# Patient Record
Sex: FEMALE | Race: BLACK OR AFRICAN AMERICAN | Employment: FULL TIME | URBAN - METROPOLITAN AREA
[De-identification: names, ages, dates, MRNs, and addresses within clinical notes are randomized per-mention and may not be internally consistent; named-entity substitution may affect disease eponyms.]

---

## 2017-01-13 ENCOUNTER — HOSPITAL ENCOUNTER (EMERGENCY)
Age: 27
Discharge: HOME | End: 2017-01-13
Attending: EMERGENCY MEDICINE | Admitting: EMERGENCY MEDICINE
Payer: COMMERCIAL

## 2017-01-13 ENCOUNTER — APPOINTMENT (OUTPATIENT)
Dept: GENERAL RADIOLOGY | Age: 27
End: 2017-01-13
Attending: PHYSICIAN ASSISTANT
Payer: COMMERCIAL

## 2017-01-13 VITALS
OXYGEN SATURATION: 100 % | TEMPERATURE: 97.9 F | HEIGHT: 65 IN | DIASTOLIC BLOOD PRESSURE: 77 MMHG | HEART RATE: 89 BPM | WEIGHT: 190 LBS | SYSTOLIC BLOOD PRESSURE: 116 MMHG | BODY MASS INDEX: 31.65 KG/M2 | RESPIRATION RATE: 20 BRPM

## 2017-01-13 DIAGNOSIS — S30.0XXA CONTUSION OF COCCYX, INITIAL ENCOUNTER: Primary | ICD-10-CM

## 2017-01-13 LAB
APPEARANCE UR: CLEAR
BILIRUB UR QL: NEGATIVE
COLOR UR: YELLOW
GLUCOSE UR STRIP.AUTO-MCNC: NEGATIVE MG/DL
HCG UR QL: NEGATIVE
HGB UR QL STRIP: NEGATIVE
KETONES UR QL STRIP.AUTO: NEGATIVE MG/DL
LEUKOCYTE ESTERASE UR QL STRIP.AUTO: NEGATIVE
NITRITE UR QL STRIP.AUTO: NEGATIVE
PH UR STRIP: 6 [PH] (ref 5–8)
PROT UR STRIP-MCNC: NEGATIVE MG/DL
SP GR UR REFRACTOMETRY: <1.005 (ref 1–1.03)
UROBILINOGEN UR QL STRIP.AUTO: 0.2 EU/DL (ref 0.2–1)

## 2017-01-13 PROCEDURE — 99283 EMERGENCY DEPT VISIT LOW MDM: CPT

## 2017-01-13 PROCEDURE — 72220 X-RAY EXAM SACRUM TAILBONE: CPT

## 2017-01-13 PROCEDURE — 81025 URINE PREGNANCY TEST: CPT | Performed by: PHYSICIAN ASSISTANT

## 2017-01-13 PROCEDURE — 81003 URINALYSIS AUTO W/O SCOPE: CPT | Performed by: PHYSICIAN ASSISTANT

## 2017-01-13 RX ORDER — HYDROCODONE BITARTRATE AND ACETAMINOPHEN 5; 325 MG/1; MG/1
1 TABLET ORAL
Qty: 20 TAB | Refills: 0 | Status: SHIPPED | OUTPATIENT
Start: 2017-01-13 | End: 2019-11-07 | Stop reason: CLARIF

## 2017-01-13 RX ORDER — NAPROXEN 500 MG/1
500 TABLET ORAL 2 TIMES DAILY WITH MEALS
Qty: 20 TAB | Refills: 0 | Status: SHIPPED | OUTPATIENT
Start: 2017-01-13 | End: 2017-01-23

## 2017-01-13 NOTE — ED PROVIDER NOTES
Patient is a 31 y.o. female presenting with fall and coccyx pain. The history is provided by the patient. Fall     Tailbone Pain      Елена Diaz is a 31 y.o. female presents with c/o pain in tailbone after a fall two days ago. Denies radicular sx, changes in bowel or urination. Denies previous history of back injury. Past Medical History:   Diagnosis Date    Anemia     Astigmatism        Past Surgical History:   Procedure Laterality Date    Hx gyn       csection         No family history on file. Social History     Social History    Marital status: SINGLE     Spouse name: N/A    Number of children: N/A    Years of education: N/A     Occupational History    Not on file. Social History Main Topics    Smoking status: Current Every Day Smoker     Packs/day: 0.25    Smokeless tobacco: Not on file    Alcohol use No    Drug use: No    Sexual activity: Yes     Partners: Male     Birth control/ protection: Condom     Other Topics Concern    Not on file     Social History Narrative         ALLERGIES: Review of patient's allergies indicates no known allergies. Review of Systems  Constitutional:  Denies malaise, fever, chills. Head:  Denies injury. Face:  Denies injury or pain. ENMT:  Denies sore throat. Neck:  Denies injury or pain. Chest:  Denies injury. Cardiac:  Denies chest pain or palpitations. Respiratory:  Denies cough, wheezing, difficulty breathing, shortness of breath. GI/ABD:  Denies injury, pain, distention, nausea, vomiting, diarrhea. :  Denies injury, pain, dysuria or urgency. Back:  Injury   Pelvis:  Denies injury or pain. Extremity/MS:  Denies injury or pain. Neuro:  Denies headache, LOC, dizziness, neurologic symptoms/deficits/paresthesias. Skin: Denies injury, rash, itching or skin changes.     Vitals:    01/13/17 1452   BP: 116/77   Pulse: 89   Resp: 20   Temp: 97.9 °F (36.6 °C)   SpO2: 100%   Weight: 86.2 kg (190 lb)   Height: 5' 5\" (1.651 m) Physical Exam   Nursing note and vitals reviewed. CONSTITUTIONAL: Alert, in no apparent distress; well-developed; well-nourished. HEAD:  Normocephalic, atraumatic. RESP: Chest clear, equal breath sounds. CV: S1 and S2 WNL; No murmurs, gallops or rubs. GI: Abdomen soft and non-tender. No masses or organomegaly. BACK:FROM, 5/5 strength, 2+DTR's,mild tenderness over coccyx, no erythema, no edema, no ecchymosis,(-) deformity, swelling, skin changes, midline tenderness or crepitus. (-) step offs. Neg SLR bilaterlly. Full sensation to deep and light palpation bilaterally. (-) foot drop  UPPER EXT:  Normal inspection. LOWER EXT: Normal inspection  NEURO:  strength 5/5 and sym, sensation intact. SKIN: No rashes; Normal for age and stage. PSYCH:  Alert and oriented, normal affect. MDM  Number of Diagnoses or Management Options  Contusion of coccyx, initial encounter:   Diagnosis management comments: DDx: sciatica, spinal stenosis, arthritis, DJD, spondylitis, muscular pain/spasm, Fx (traumatic, compression, etc.), cauda equina, epidural abscess, UTI, pyelo, STD,  Dlhi-Ytqe-Hlicxr syndrome, kidney stones, preg, ectopic, ovarian cyst, ovarian torsion, GI etiology (constipation, pancreatitis, hepatitis, gastritis, diverticulitis, colitis, gastric cancer, etc), acute abdomen (appendicitis, SBO, etc.), AAA, malignancy  IMPRESSION AND MEDICAL DECISION MAKING:  Based upon the patient's presentation with noted HPI and PE, along with the work up done in the emergency department, I believe that the patient has a contusion to her coccyx region. Will treat and have her follow up with her PCP as needed. The patient will be discharged home. Warning signs of worsening condition were discussed and understood by the patient. Based on patient's age, coexisting illness, exam, and the results of this ED evaluation, the decision to treat as an outpatient was made.  Based on the information available at time of discharge, acute pathology requiring immediate intervention was deemed relative unlikely. While it is impossible to completely exclude the possibility of underlying serious disease or worsening of condition, I feel the relative likelihood is extremely low. I discussed this uncertainty with the patient, who understood ED evaluation and treatment and felt comfortable with the outpatient treatment plan. All questions regarding care, test results, and follow up were answered. The patient is stable and appropriate to discharge. They understand that they should return to the emergency department for any new or worsening symptoms. I stressed the importance of follow up for repeat assessment and possibly further evaluation/treatment. Amount and/or Complexity of Data Reviewed  Clinical lab tests: ordered and reviewed  Tests in the radiology section of CPT®: ordered and reviewed  Independent visualization of images, tracings, or specimens: yes (No acute fracture observed)      ED Course       Procedures    Vitals:  Patient Vitals for the past 12 hrs:   Temp Pulse Resp BP SpO2   01/13/17 1452 97.9 °F (36.6 °C) 89 20 116/77 100 %         Medications ordered:   Medications - No data to display      Lab findings:  No results found for this or any previous visit (from the past 12 hour(s)). EKG interpretation by ED Physician:      X-Ray, CT or other radiology findings or impressions:  XR SACRUM AND COCCYX    (Results Pending)       Progress notes, Consult notes or additional Procedure notes:       Disposition:  Diagnosis: No diagnosis found. Disposition:   3:01 PM Pt reevaluated at this time and is resting comfortably in NAD. Discussed results and findings, as well as, diagnosis and plan for discharge. Pt verbalizes understanding and agreement with plan. All questions addressed at this time.      Follow-up Information     None           Patient's Medications    No medications on file

## 2017-01-13 NOTE — DISCHARGE INSTRUCTIONS
Low Back Contusion: Care Instructions  Your Care Instructions  Contusion is the medical term for a bruise. When you have a low back bruise, it's often caused by a direct blow or an impact, such as falling against a counter or table. Bruises are common sports injuries. Most people think of a bruise as a black-and-blue spot. This happens when small blood vessels get torn and leak blood under the skin. But bones, muscles, and organs can also get bruised. If these deep tissues are damaged, you may not always see a bruise. The doctor will examine your bruise. You may also have tests to make sure you do not have a more serious injury, such as a broken bone or nerve damage. Tests may include X-rays or other imaging tests like a CT scan or MRI. Low back bruises may cause pain and swelling. But if there is no serious damage, they will often get better with home treatment in several days to a few weeks. The doctor has checked you carefully, but problems can develop later. If you notice any problems or new symptoms, get medical treatment right away. Follow-up care is a key part of your treatment and safety. Be sure to make and go to all appointments, and call your doctor if you are having problems. It's also a good idea to know your test results and keep a list of the medicines you take. How can you care for yourself at home? · Put ice or a cold pack on the sore area for 10 to 20 minutes at a time to stop swelling. Put a thin cloth between the ice pack and your skin. · Be safe with medicines. Read and follow all instructions on the label. ¨ If the doctor gave you a prescription medicine for pain, take it as prescribed. ¨ If you are not taking a prescription pain medicine, ask your doctor if you can take an over-the-counter medicine. · For the first day or two of pain, take it easy. But as soon as possible, get back to your normal daily life and activities. · Get gentle exercise, such as walking.  Movement keeps your spine flexible and helps your muscles stay strong. When should you call for help? Call 911 anytime you think you may need emergency care. For example, call if:  · You are unable to move a leg at all. Call your doctor now or seek immediate medical care if:  · You have new or worse symptoms in your legs or buttocks. Symptoms may include:  ¨ Numbness or tingling. ¨ Weakness. ¨ Pain. · You lose bladder or bowel control. · You have blood in your urine. Watch closely for changes in your health, and be sure to contact your doctor if:  · You do not get better as expected. Where can you learn more? Go to http://emily-julius.info/. Enter V337 in the search box to learn more about \"Low Back Contusion: Care Instructions. \"  Current as of: May 27, 2016  Content Version: 11.1  © 3223-9305 [employer]. Care instructions adapted under license by [employer] (which disclaims liability or warranty for this information). If you have questions about a medical condition or this instruction, always ask your healthcare professional. [address] any warranty or liability for your use of this information.

## 2017-12-09 ENCOUNTER — HOSPITAL ENCOUNTER (EMERGENCY)
Age: 27
Discharge: HOME | End: 2017-12-09
Attending: EMERGENCY MEDICINE
Payer: COMMERCIAL

## 2017-12-09 VITALS
TEMPERATURE: 98.5 F | OXYGEN SATURATION: 100 % | SYSTOLIC BLOOD PRESSURE: 110 MMHG | DIASTOLIC BLOOD PRESSURE: 70 MMHG | RESPIRATION RATE: 15 BRPM | HEART RATE: 73 BPM

## 2017-12-09 DIAGNOSIS — H81.90 ACUTE VESTIBULAR SYNDROME: Primary | ICD-10-CM

## 2017-12-09 DIAGNOSIS — E86.0 DEHYDRATION: ICD-10-CM

## 2017-12-09 LAB
ALBUMIN SERPL-MCNC: 3.7 G/DL (ref 3.4–5)
ALBUMIN/GLOB SERPL: 0.9 {RATIO} (ref 0.8–1.7)
ALP SERPL-CCNC: 103 U/L (ref 45–117)
ALT SERPL-CCNC: 20 U/L (ref 13–56)
ANION GAP SERPL CALC-SCNC: 7 MMOL/L (ref 3–18)
APPEARANCE UR: CLEAR
AST SERPL-CCNC: 10 U/L (ref 15–37)
BASOPHILS # BLD: 0 K/UL (ref 0–0.1)
BASOPHILS NFR BLD: 0 % (ref 0–2)
BILIRUB SERPL-MCNC: 0.4 MG/DL (ref 0.2–1)
BILIRUB UR QL: NEGATIVE
BUN SERPL-MCNC: 6 MG/DL (ref 7–18)
BUN/CREAT SERPL: 9 (ref 12–20)
CALCIUM SERPL-MCNC: 8.6 MG/DL (ref 8.5–10.1)
CHLORIDE SERPL-SCNC: 105 MMOL/L (ref 100–108)
CO2 SERPL-SCNC: 26 MMOL/L (ref 21–32)
COLOR UR: YELLOW
CREAT SERPL-MCNC: 0.65 MG/DL (ref 0.6–1.3)
DIFFERENTIAL METHOD BLD: ABNORMAL
EOSINOPHIL # BLD: 0.3 K/UL (ref 0–0.4)
EOSINOPHIL NFR BLD: 2 % (ref 0–5)
ERYTHROCYTE [DISTWIDTH] IN BLOOD BY AUTOMATED COUNT: 15.1 % (ref 11.6–14.5)
GLOBULIN SER CALC-MCNC: 3.9 G/DL (ref 2–4)
GLUCOSE SERPL-MCNC: 78 MG/DL (ref 74–99)
GLUCOSE UR STRIP.AUTO-MCNC: NEGATIVE MG/DL
HCG UR QL: NEGATIVE
HCT VFR BLD AUTO: 37.5 % (ref 35–45)
HGB BLD-MCNC: 12.3 G/DL (ref 12–16)
HGB UR QL STRIP: NEGATIVE
KETONES UR QL STRIP.AUTO: ABNORMAL MG/DL
LEUKOCYTE ESTERASE UR QL STRIP.AUTO: NEGATIVE
LYMPHOCYTES # BLD: 3 K/UL (ref 0.9–3.6)
LYMPHOCYTES NFR BLD: 27 % (ref 21–52)
MCH RBC QN AUTO: 27.1 PG (ref 24–34)
MCHC RBC AUTO-ENTMCNC: 32.8 G/DL (ref 31–37)
MCV RBC AUTO: 82.6 FL (ref 74–97)
MONOCYTES # BLD: 1.2 K/UL (ref 0.05–1.2)
MONOCYTES NFR BLD: 11 % (ref 3–10)
NEUTS SEG # BLD: 6.4 K/UL (ref 1.8–8)
NEUTS SEG NFR BLD: 60 % (ref 40–73)
NITRITE UR QL STRIP.AUTO: NEGATIVE
PH UR STRIP: 6.5 [PH] (ref 5–8)
PLATELET # BLD AUTO: 334 K/UL (ref 135–420)
PMV BLD AUTO: 9.1 FL (ref 9.2–11.8)
POTASSIUM SERPL-SCNC: 3.6 MMOL/L (ref 3.5–5.5)
PROT SERPL-MCNC: 7.6 G/DL (ref 6.4–8.2)
PROT UR STRIP-MCNC: NEGATIVE MG/DL
RBC # BLD AUTO: 4.54 M/UL (ref 4.2–5.3)
SODIUM SERPL-SCNC: 138 MMOL/L (ref 136–145)
SP GR UR REFRACTOMETRY: 1.03 (ref 1–1.03)
UROBILINOGEN UR QL STRIP.AUTO: 1 EU/DL (ref 0.2–1)
WBC # BLD AUTO: 10.9 K/UL (ref 4.6–13.2)

## 2017-12-09 PROCEDURE — 80053 COMPREHEN METABOLIC PANEL: CPT | Performed by: EMERGENCY MEDICINE

## 2017-12-09 PROCEDURE — 74011250636 HC RX REV CODE- 250/636: Performed by: EMERGENCY MEDICINE

## 2017-12-09 PROCEDURE — 85025 COMPLETE CBC W/AUTO DIFF WBC: CPT | Performed by: EMERGENCY MEDICINE

## 2017-12-09 PROCEDURE — 99285 EMERGENCY DEPT VISIT HI MDM: CPT

## 2017-12-09 PROCEDURE — 81025 URINE PREGNANCY TEST: CPT | Performed by: EMERGENCY MEDICINE

## 2017-12-09 PROCEDURE — 81003 URINALYSIS AUTO W/O SCOPE: CPT | Performed by: EMERGENCY MEDICINE

## 2017-12-09 PROCEDURE — 96361 HYDRATE IV INFUSION ADD-ON: CPT

## 2017-12-09 PROCEDURE — 96360 HYDRATION IV INFUSION INIT: CPT

## 2017-12-09 RX ORDER — MECLIZINE HCL 12.5 MG 12.5 MG/1
25 TABLET ORAL DAILY
Status: DISCONTINUED | OUTPATIENT
Start: 2017-12-10 | End: 2017-12-09

## 2017-12-09 RX ORDER — MECLIZINE HCL 12.5 MG 12.5 MG/1
25 TABLET ORAL
Status: COMPLETED | OUTPATIENT
Start: 2017-12-09 | End: 2017-12-09

## 2017-12-09 RX ORDER — MECLIZINE HYDROCHLORIDE 25 MG/1
25 TABLET ORAL
Qty: 15 TAB | Refills: 0 | Status: SHIPPED | OUTPATIENT
Start: 2017-12-09 | End: 2017-12-19

## 2017-12-09 RX ADMIN — SODIUM CHLORIDE 2000 ML: 900 INJECTION, SOLUTION INTRAVENOUS at 18:19

## 2017-12-09 RX ADMIN — MECLIZINE 25 MG: 12.5 TABLET ORAL at 18:20

## 2017-12-09 NOTE — ED TRIAGE NOTES
Patient arrived from home c/o headache and dizziness. Patient denies shortness of breath or chest pain. Patient denies nausea vomiting chills fever.  Patient allergies verified Patient denies pain

## 2017-12-09 NOTE — ED PROVIDER NOTES
EMERGENCY DEPARTMENT HISTORY AND PHYSICAL EXAM    5:49 PM      Date: 12/9/2017  Patient Name: Amy Chavez    History of Presenting Illness     No chief complaint on file. History Provided By: Patient    Additional History (Context): Amy Chavez is a 28 y.o. female with No significant past medical history who presents with moderate intermittent worsening dizziness (room spinning) for 1 day that got worse about a hour ago. She states it lasts for about 10 seconds but comes back when she closes her eyes or moves her head. She states the dizziness gave her a headache and she took two Tylenol with no improvement of headache. Pt denies fever, chills or chest pain, ETOH use, drug use. Pt admits to tobacco use. No other concerns or symptoms at this time. PCP: Mk Daily MD    Chief Complaint: dizziness  Duration: 1 Days  Timing:  Intermittent and Worsening  Location: n/a  Quality: room spinning   Severity: Moderate  Modifying Factors: closing eyes or head movement makes dizziness come back, took 2 Tylenol with no improvement of headache  Associated Symptoms: headache      Current Facility-Administered Medications   Medication Dose Route Frequency Provider Last Rate Last Dose    [START ON 12/10/2017] meclizine (ANTIVERT) tablet 25 mg  25 mg Oral DAILY Evangelina Saldana MD        sodium chloride 0.9 % bolus infusion 2,000 mL  2,000 mL IntraVENous ONCE Evangelina Saldana MD         Current Outpatient Prescriptions   Medication Sig Dispense Refill    HYDROcodone-acetaminophen (NORCO) 5-325 mg per tablet Take 1 Tab by mouth every four (4) hours as needed for Pain. Max Daily Amount: 6 Tabs. 20 Tab 0       Past History     Past Medical History:  Past Medical History:   Diagnosis Date    Anemia     Astigmatism        Past Surgical History:  Past Surgical History:   Procedure Laterality Date    HX GYN      csection       Family History:  No family history on file.     Social History:  Social History Substance Use Topics    Smoking status: Current Every Day Smoker     Packs/day: 0.25    Smokeless tobacco: Not on file    Alcohol use No       Allergies:  No Known Allergies      Review of Systems     Review of Systems   Constitutional: Negative for chills. Neurological: Positive for dizziness and light-headedness. Physical Exam     Visit Vitals    BP (!) 81/48    Pulse (!) 103    Temp 98.5 °F (36.9 °C)    Resp 17    SpO2 100%       Physical Exam   Constitutional: She is oriented to person, place, and time. She appears well-developed. HENT:   Head: Normocephalic and atraumatic. Eyes: EOM are normal. Pupils are equal, round, and reactive to light. Neck: Normal range of motion. Neck supple. Cardiovascular: Normal rate, regular rhythm and normal heart sounds. Exam reveals no friction rub. No murmur heard. Pulmonary/Chest: Effort normal and breath sounds normal. No respiratory distress. She has no wheezes. Abdominal: Soft. She exhibits no distension. There is no tenderness. There is no rebound and no guarding. Musculoskeletal: Normal range of motion. Neurological: She is alert and oriented to person, place, and time. Skin: Skin is warm and dry. Psychiatric: She has a normal mood and affect. Her behavior is normal. Thought content normal.         Diagnostic Study Results         Medical Decision Making     1. Spinning; sensation of spinnign lasts about 10 seconds and resovles without intervention. Worse with position. No secondary sx of cva. BP noted. Will hydrate and get hcg. Pt without infectious sx. Pt denies headache to me    6:29 PM bp 120 hr 70; still sx. Turned over to dr Nadiya Das pending dipso. Diagnosis     No diagnosis found.     _______________________________    Attestations:  Scribe [address] [place] [address] acting as a scribe for and in the presence of Ruddy Valdez MD      December 09, 2017 at 5:49 PM       Provider Attestation:      I personally performed the services described in the documentation, reviewed the documentation, as recorded by the scribe in my presence, and it accurately and completely records my words and actions.  December 09, 2017 at 5:49 PM - Sharron Xiong MD    _______________________________

## 2017-12-10 NOTE — ED NOTES
1900: I assumed care of this patient from Dr. Gene Hollis. Patient presented with dizziness nausea vomiting diarrhea and transiently low blood pressure responded to fluids. She had reportedly unremarkable examination responded to fluid and was signed out pending reevaluation for meclizine efficacy. She was reevaluated by myself at 2030 hrs.: She is feeling slightly better, she had normal vital signs, she has a nonfocal neurological examination, pupils are 3 mm round and reactive, and no photophobia no nystagmus on H test, TMs clear bilaterally, she does report ear pain on both sides, her abdomen is benign, her  strength is equal bilaterally with no past pointing, her gait was not ataxic and narrow based. Mostly has an acute vestibular syndrome, we'll discharge with meclizine, strict return instructions follow-up as an outpatient. Patient's presentation, history, physical exam and laboratory evaluations were reviewed. At this time patient was felt to be stable for outpatient management and follow with primary care/specialist.  Patient was instructed to return to the emergency department with any concerns. Disposition:    Discharged home      Portions of this chart were created with Dragon medical speech to text program.   Unrecognized errors may be present.

## 2019-03-19 ENCOUNTER — HOSPITAL ENCOUNTER (EMERGENCY)
Age: 29
Discharge: HOME | End: 2019-03-19
Attending: EMERGENCY MEDICINE
Payer: COMMERCIAL

## 2019-03-19 ENCOUNTER — APPOINTMENT (OUTPATIENT)
Dept: GENERAL RADIOLOGY | Age: 29
End: 2019-03-19
Attending: PHYSICIAN ASSISTANT
Payer: COMMERCIAL

## 2019-03-19 VITALS
TEMPERATURE: 99.8 F | SYSTOLIC BLOOD PRESSURE: 128 MMHG | OXYGEN SATURATION: 100 % | HEART RATE: 101 BPM | DIASTOLIC BLOOD PRESSURE: 78 MMHG | RESPIRATION RATE: 16 BRPM

## 2019-03-19 DIAGNOSIS — R50.9 FEVER, UNSPECIFIED FEVER CAUSE: ICD-10-CM

## 2019-03-19 DIAGNOSIS — R52 GENERALIZED BODY ACHES: ICD-10-CM

## 2019-03-19 DIAGNOSIS — J02.9 SORE THROAT: ICD-10-CM

## 2019-03-19 DIAGNOSIS — J10.1 INFLUENZA A: Primary | ICD-10-CM

## 2019-03-19 LAB
FLUAV AG NPH QL IA: POSITIVE
FLUBV AG NOSE QL IA: NEGATIVE

## 2019-03-19 PROCEDURE — 87804 INFLUENZA ASSAY W/OPTIC: CPT

## 2019-03-19 PROCEDURE — 99283 EMERGENCY DEPT VISIT LOW MDM: CPT

## 2019-03-19 PROCEDURE — 87081 CULTURE SCREEN ONLY: CPT

## 2019-03-19 PROCEDURE — 71046 X-RAY EXAM CHEST 2 VIEWS: CPT

## 2019-03-19 PROCEDURE — 74011250637 HC RX REV CODE- 250/637: Performed by: PHYSICIAN ASSISTANT

## 2019-03-19 RX ORDER — OSELTAMIVIR PHOSPHATE 75 MG/1
75 CAPSULE ORAL 2 TIMES DAILY
Qty: 10 CAP | Refills: 0 | Status: SHIPPED | OUTPATIENT
Start: 2019-03-19 | End: 2019-03-24

## 2019-03-19 RX ORDER — ACETAMINOPHEN 500 MG
1000 TABLET ORAL
Status: COMPLETED | OUTPATIENT
Start: 2019-03-19 | End: 2019-03-19

## 2019-03-19 RX ORDER — ACETAMINOPHEN 325 MG/1
650 TABLET ORAL
Qty: 20 TAB | Refills: 0 | Status: SHIPPED | OUTPATIENT
Start: 2019-03-19 | End: 2021-05-16

## 2019-03-19 RX ORDER — IBUPROFEN 600 MG/1
600 TABLET ORAL
Status: COMPLETED | OUTPATIENT
Start: 2019-03-19 | End: 2019-03-19

## 2019-03-19 RX ORDER — IBUPROFEN 800 MG/1
800 TABLET ORAL
Qty: 20 TAB | Refills: 0 | Status: SHIPPED | OUTPATIENT
Start: 2019-03-19 | End: 2019-03-26

## 2019-03-19 RX ADMIN — IBUPROFEN 600 MG: 600 TABLET ORAL at 14:42

## 2019-03-19 RX ADMIN — ACETAMINOPHEN 1000 MG: 500 TABLET ORAL at 14:42

## 2019-03-19 NOTE — ED TRIAGE NOTES
Patient arrived from home c/o flu-like symptoms since last night. Patient denies taking otc medications except nyquil for sleep. No allergies at this time

## 2019-03-19 NOTE — ED PROVIDER NOTES
EMERGENCY DEPARTMENT HISTORY AND PHYSICAL EXAM 
 
Date: 3/19/2019 Patient Name: Krishna Mason History of Presenting Illness Chief Complaint Patient presents with  Flu Like Symptoms History Provided By: Patient Chief Complaint: sore throat Duration: 1 days Timing:  Acute Location: throat Quality: redness Modifying Factors: Pt did not get a flu shot Associated Symptoms: bilateral ear pain, body aches,  HA, and fever. Additional History (Context): Krishna Mason is a 26 y.o. female with with a history of anemia and astigmatism who presents with sore throat, body aches, headache, cough, bilateral ear pain. That started last night. . Pt did not get the flu vaccine. No OTC meds for her sx. Denies SOB. Denies drooling changes in voice shortness of breath or inability to speak in full sentences. PCP: Celso Haas MD 
 
Current Outpatient Medications Medication Sig Dispense Refill  oseltamivir (TAMIFLU) 75 mg capsule Take 1 Cap by mouth two (2) times a day for 5 days. 10 Cap 0  ibuprofen (MOTRIN) 800 mg tablet Take 1 Tab by mouth every six (6) hours as needed for Pain for up to 7 days. 20 Tab 0  
 acetaminophen (TYLENOL) 325 mg tablet Take 2 Tabs by mouth every four (4) hours as needed for Pain. 20 Tab 0  
 HYDROcodone-acetaminophen (NORCO) 5-325 mg per tablet Take 1 Tab by mouth every four (4) hours as needed for Pain. Max Daily Amount: 6 Tabs. 20 Tab 0 Past History Past Medical History: 
Past Medical History:  
Diagnosis Date  Anemia  Astigmatism Past Surgical History: 
Past Surgical History:  
Procedure Laterality Date  HX GYN    
 csection Family History: No family history on file. Social History: 
Social History Tobacco Use  Smoking status: Current Every Day Smoker Packs/day: 0.25 Substance Use Topics  Alcohol use: No  
 Drug use: No  
 
 
Allergies: 
No Known Allergies Review of Systems Review of Systems Constitutional: Positive for fatigue (body aches) and fever. Negative for chills. HENT: Positive for sore throat. Negative for congestion and rhinorrhea. Eyes: Positive for pain. Negative for visual disturbance. Respiratory: Negative for cough and shortness of breath. Cardiovascular: Negative for chest pain and palpitations. Gastrointestinal: Negative for abdominal pain, nausea and vomiting. Musculoskeletal: Negative for back pain and myalgias. Skin: Negative. Allergic/Immunologic: Negative. Neurological: Positive for headaches. All other systems reviewed and are negative. All Other Systems Negative Physical Exam  
 
Vitals:  
 03/19/19 1429 03/19/19 1540 BP: 128/78 Pulse: (!) 113 (!) 101 Resp: 16 Temp: (!) 101 °F (38.3 °C) 99.8 °F (37.7 °C) SpO2: 100% Physical Exam  
Constitutional: She is oriented to person, place, and time. She appears well-developed and well-nourished. No distress. HENT:  
Head: Normocephalic and atraumatic. Right Ear: Tympanic membrane and ear canal normal.  
Left Ear: Tympanic membrane and ear canal normal.  
Nose: Nose normal. Right sinus exhibits no maxillary sinus tenderness and no frontal sinus tenderness. Left sinus exhibits no maxillary sinus tenderness and no frontal sinus tenderness. Mouth/Throat: Uvula is midline, oropharynx is clear and moist and mucous membranes are normal. No trismus in the jaw. No uvula swelling. Tonsils are equal and bilateral.  No drooling. Eyes: Conjunctivae are normal.  
Neck: Normal range of motion. Neck supple. Cardiovascular: Normal rate, regular rhythm and normal heart sounds. Pulmonary/Chest: Effort normal and breath sounds normal. No respiratory distress. She exhibits no tenderness. Abdominal: Soft. Bowel sounds are normal. She exhibits no distension. There is no tenderness. There is no rebound and no guarding. Musculoskeletal: She exhibits no edema or deformity. Neurological: She is alert and oriented to person, place, and time. She has normal reflexes. Skin: Skin is warm and dry. She is not diaphoretic. Psychiatric: She has a normal mood and affect. Nursing note and vitals reviewed. Diagnostic Study Results Labs - Recent Results (from the past 12 hour(s)) STREP THROAT SCREEN Collection Time: 03/19/19  2:30 PM  
Result Value Ref Range Special Requests: NO SPECIAL REQUESTS Strep Screen NEGATIVE Culture result: PENDING   
INFLUENZA A & B AG (RAPID TEST) Collection Time: 03/19/19  2:30 PM  
Result Value Ref Range Influenza A Antigen POSITIVE (A) NEG Influenza B Antigen NEGATIVE  NEG Radiologic Studies -  
XR CHEST PA LAT    (Results Pending) CT Results  (Last 48 hours) None CXR Results  (Last 48 hours) None Medical Decision Making I am the first provider for this patient. I reviewed the vital signs, available nursing notes, past medical history, past surgical history, family history and social history. Vital Signs-Reviewed the patient's vital signs. Pulse Oximetry Analysis -  100 % RA Records Reviewed: Nursing Notes and Old Medical Records Procedures: None Procedures Provider Notes (Medical Decision Making):  
 
Differential Diagnosis:  influenza, mononucleosis, acute bronchitis, URI, streptococcal pharyngitis, pertussis, pneumonia, asthma exacerbation, allergic rhinitis Plan: 
 
2:36 PM 
Do not feel patient's current SIRS criteria is attributed to sepsis at this time will order throat screen, flu swab, chest x-ray, Tylenol and Motrin and continue to reevaluate and reassess. Discussed with patient limitations of flu swab. 3:45 PM 
Fever has normalized. Have shared positive flu with patient. Have stressed the importance of alternating Tylenol and Motrin at home. Have shared reassuring throat screen and chest x-ray.   Have stressed the importance of hydration and rest at home. We will send home with work note. Return precautions given as well as PCP follow-up. Patient agrees with the plan and management and states all questions have been thoroughly answered and there are no more remaining questions. MED RECONCILIATION: 
No current facility-administered medications for this encounter. Current Outpatient Medications Medication Sig  oseltamivir (TAMIFLU) 75 mg capsule Take 1 Cap by mouth two (2) times a day for 5 days.  ibuprofen (MOTRIN) 800 mg tablet Take 1 Tab by mouth every six (6) hours as needed for Pain for up to 7 days.  acetaminophen (TYLENOL) 325 mg tablet Take 2 Tabs by mouth every four (4) hours as needed for Pain.  HYDROcodone-acetaminophen (NORCO) 5-325 mg per tablet Take 1 Tab by mouth every four (4) hours as needed for Pain. Max Daily Amount: 6 Tabs. Disposition: 
Home DISCHARGE NOTE:  
Pt has been reexamined. Patient has no new complaints, changes, or physical findings. Care plan outlined and precautions discussed. Results of workup were reviewed with the patient. All medications were reviewed with the patient. All of pt's questions and concerns were addressed. Patient was instructed and agrees to follow up with PCP, as well as to return to the ED upon further deterioration. Patient is ready to go home. Follow-up Information Follow up With Specialties Details Why Contact Info [facility] EMERGENCY DEPT Emergency Medicine  As needed [address] [id number] 
[phone] Celso Haas MD Obstetrics & Gynecology, Gynecology, Obstetrics In 2 days  [address] [place] [id number] 
[phone] Current Discharge Medication List  
  
START taking these medications Details  
oseltamivir (TAMIFLU) 75 mg capsule Take 1 Cap by mouth two (2) times a day for 5 days.  
Qty: 10 Cap, Refills: 0  
  
ibuprofen (MOTRIN) 800 mg tablet Take 1 Tab by mouth every six (6) hours as needed for Pain for up to 7 days. Qty: 20 Tab, Refills: 0  
  
acetaminophen (TYLENOL) 325 mg tablet Take 2 Tabs by mouth every four (4) hours as needed for Pain. Qty: 20 Tab, Refills: 0 Diagnosis Clinical Impression:  
1. Influenza A 2. Fever, unspecified fever cause 3. Generalized body aches 4. Sore throat Scribe Attestation Ronbrendan Valdestasha acting as a scribe for and in the presence of Raven Stack March 19, 2019 at 2:28 PM 
    
Provider Attestation:     
I personally performed the services described in the documentation, reviewed the documentation, as recorded by the scribe in my presence, and it accurately and completely records my words and actions.  March 19, 2019 at 2:28 PM - Raven Stack

## 2019-03-19 NOTE — LETTER
[facility] EMERGENCY DEPT 
[address] [facility] [id number] 
[phone] Work/School Note Date: 3/19/2019 To Whom It May concern: 
 
Adina Carey was seen and treated today in the emergency room by the following provider(s): 
Attending Provider: Iver Bence, MD 
Physician Assistant: Pavithra Gonzalez. Adina Carey may return to work on March 25, 2019. Sincerely, Pavithra Robison

## 2019-03-19 NOTE — DISCHARGE INSTRUCTIONS
Patient Education        Learning About Fever  What is a fever? A fever is a high body temperature. It's one way your body fights being sick. A fever shows that the body is responding to infection or other illnesses, both minor and severe. A fever is a symptom, not an illness by itself. A fever can be a sign that you are ill, but most fevers are not caused by a serious problem. You may have a fever with a minor illness, such as a cold. But sometimes a very serious infection may cause little or no fever. It is important to look at other symptoms, other conditions you have, and how you feel in general. In children, notice how they act and see what symptoms they complain of. What is a normal body temperature? A normal body temperature is about 98. 6ºF. Some people have a normal temperature that is a little higher or a little lower than this. Your temperature may be a little lower in the morning than it is later in the day. It may go up during hot weather or when you exercise, wear heavy clothes, or take a hot bath. Your temperature may also be different depending on how you take it. A temperature taken in the mouth (oral) or under the arm may be a little lower than your core temperature (rectal). What is a fever temperature? A core temperature of 100.4°F or above is considered a fever. What can cause a fever? A fever may be caused by:  · Infections. This is the most common cause of a fever. Examples of infections that can cause a fever include the flu, a kidney infection, or pneumonia. · Some medicines. · Severe trauma or injury, such as a heart attack, stroke, heatstroke, or burns. · Other medical conditions, such as arthritis and some cancers. How can you treat a fever at home? · Ask your doctor if you can take an over-the-counter pain medicine, such as acetaminophen (Tylenol), ibuprofen (Advil, Motrin), or naproxen (Aleve). Be safe with medicines. Read and follow all instructions on the label.   · To prevent dehydration, drink plenty of fluids. Choose water and other caffeine-free clear liquids until you feel better. If you have kidney, heart, or liver disease and have to limit fluids, talk with your doctor before you increase the amount of fluids you drink. Follow-up care is a key part of your treatment and safety. Be sure to make and go to all appointments, and call your doctor if you are having problems. It's also a good idea to know your test results and keep a list of the medicines you take. Where can you learn more? Go to http://emily-julius.info/. Enter T014 in the search box to learn more about \"Learning About Fever. \"  Current as of: September 23, 2018  Content Version: 11.9  © 1138-8696 [employer]. Care instructions adapted under license by [employer] (which disclaims liability or warranty for this information). If you have questions about a medical condition or this instruction, always ask your healthcare professional. [place] [address] any warranty or liability for your use of this information. Patient Education        Influenza (Flu): Care Instructions  Your Care Instructions    Influenza (flu) is an infection in the lungs and breathing passages. It is caused by the influenza virus. There are different strains, or types, of the flu virus from year to year. Unlike the common cold, the flu comes on suddenly and the symptoms, such as a cough, congestion, fever, chills, fatigue, aches, and pains, are more severe. These symptoms may last up to 10 days. Although the flu can make you feel very sick, it usually doesn't cause serious health problems. Home treatment is usually all you need for flu symptoms. But your doctor may prescribe antiviral medicine to prevent other health problems, such as pneumonia, from developing.  Older people and those who have a long-term health condition, such as lung disease, are most at risk for having pneumonia or other health problems. Follow-up care is a key part of your treatment and safety. Be sure to make and go to all appointments, and call your doctor if you are having problems. It's also a good idea to know your test results and keep a list of the medicines you take. How can you care for yourself at home? · Get plenty of rest.  · Drink plenty of fluids, enough so that your urine is light yellow or clear like water. If you have kidney, heart, or liver disease and have to limit fluids, talk with your doctor before you increase the amount of fluids you drink. · Take an over-the-counter pain medicine if needed, such as acetaminophen (Tylenol), ibuprofen (Advil, Motrin), or naproxen (Aleve), to relieve fever, headache, and muscle aches. Read and follow all instructions on the label. No one younger than 20 should take aspirin. It has been linked to Reye syndrome, a serious illness. · Do not smoke. Smoking can make the flu worse. If you need help quitting, talk to your doctor about stop-smoking programs and medicines. These can increase your chances of quitting for good. · Breathe moist air from a hot shower or from a sink filled with hot water to help clear a stuffy nose. · Before you use cough and cold medicines, check the label. These medicines may not be safe for young children or for people with certain health problems. · If the skin around your nose and lips becomes sore, put some petroleum jelly on the area. · To ease coughing:  ? Drink fluids to soothe a scratchy throat. ? Suck on cough drops or plain hard candy. ? Take an over-the-counter cough medicine that contains dextromethorphan to help you get some sleep. Read and follow all instructions on the label. ? Raise your head at night with an extra pillow. This may help you rest if coughing keeps you awake. · Take any prescribed medicine exactly as directed. Call your doctor if you think you are having a problem with your medicine.   To avoid spreading the flu  · Wash your hands regularly, and keep your hands away from your face. · Stay home from school, work, and other public places until you are feeling better and your fever has been gone for at least 24 hours. The fever needs to have gone away on its own without the help of medicine. · Ask people living with you to talk to their doctors about preventing the flu. They may get antiviral medicine to keep from getting the flu from you. · To prevent the flu in the future, get a flu vaccine every fall. Encourage people living with you to get the vaccine. · Cover your mouth when you cough or sneeze. When should you call for help? Call 911 anytime you think you may need emergency care. For example, call if:    · You have severe trouble breathing.    Call your doctor now or seek immediate medical care if:    · You have new or worse trouble breathing.     · You seem to be getting much sicker.     · You feel very sleepy or confused.     · You have a new or higher fever.     · You get a new rash.    Watch closely for changes in your health, and be sure to contact your doctor if:    · You begin to get better and then get worse.     · You are not getting better after 1 week. Where can you learn more? Go to http://emily-julius.info/. Enter O020 in the search box to learn more about \"Influenza (Flu): Care Instructions. \"  Current as of: September 5, 2018  Content Version: 11.9  © 6847-5684 [employer]. Care instructions adapted under license by [employer] (which disclaims liability or warranty for this information). If you have questions about a medical condition or this instruction, always ask your healthcare professional. [address] any warranty or liability for your use of this information.

## 2019-03-21 LAB
B-HEM STREP THROAT QL CULT: NEGATIVE
BACTERIA SPEC CULT: NORMAL
SERVICE CMNT-IMP: NORMAL

## 2019-11-04 ENCOUNTER — APPOINTMENT (OUTPATIENT)
Dept: GENERAL RADIOLOGY | Age: 29
End: 2019-11-04
Attending: EMERGENCY MEDICINE
Payer: COMMERCIAL

## 2019-11-04 ENCOUNTER — HOSPITAL ENCOUNTER (EMERGENCY)
Age: 29
Discharge: HOME | End: 2019-11-04
Attending: EMERGENCY MEDICINE
Payer: COMMERCIAL

## 2019-11-04 VITALS
BODY MASS INDEX: 36.65 KG/M2 | HEART RATE: 91 BPM | OXYGEN SATURATION: 100 % | RESPIRATION RATE: 14 BRPM | HEIGHT: 65 IN | DIASTOLIC BLOOD PRESSURE: 79 MMHG | WEIGHT: 220 LBS | TEMPERATURE: 98.4 F | SYSTOLIC BLOOD PRESSURE: 130 MMHG

## 2019-11-04 DIAGNOSIS — S60.221A CONTUSION OF RIGHT HAND, INITIAL ENCOUNTER: Primary | ICD-10-CM

## 2019-11-04 PROCEDURE — 99281 EMR DPT VST MAYX REQ PHY/QHP: CPT

## 2019-11-04 PROCEDURE — 73130 X-RAY EXAM OF HAND: CPT

## 2019-11-04 RX ORDER — IBUPROFEN 800 MG/1
800 TABLET ORAL
Qty: 20 TAB | Refills: 0 | Status: SHIPPED | OUTPATIENT
Start: 2019-11-04 | End: 2019-11-07 | Stop reason: CLARIF

## 2019-11-04 NOTE — LETTER
NOTIFICATION OF RETURN TO WORK 
 
11/4/2019 5:28 PM 
 
Ms. Angel Mitchell [address] [place] [id number] Nina Ask To Whom It May Concern: 
 
Angel Mitchell was under the care of [facility]. She will be able to return to work on Wednesday, 11/06/19. If there are questions or concerns please have the patient contact our office. Sincerely, Maury Vasquez PA-C

## 2019-11-04 NOTE — ED TRIAGE NOTES
The patient presents for evaluation of right hand pain. States, \"I was moving a TV and it dropped on my hand.  \"

## 2019-11-04 NOTE — DISCHARGE INSTRUCTIONS
Patient Education     Please return immediately to the Emergency Room for re-evaluation if you are not improving, develop any new symptoms, or develop worsening of current symptoms! If you have been prescribed a medication and are unable to take this medication for any reason, please return to the Emergency Department for further evaluation! If you have been referred for follow-up to a specialist, but are unable to follow-up and your symptoms are either not improving or are worsening, please return to the Emergency Department for further evaluation! Hand Bruises: Care Instructions  Your Care Instructions  Bruises, or contusions, can happen as a result of an impact or fall. Most people think of a bruise as a black-and-blue spot. This happens when small blood vessels get torn and leak blood under the skin. The bruise may turn purplish black, reddish blue, or yellowish green as it heals. But bones and muscles can also get bruised. This may damage the hand but not cause a bruise that you can see. Most bruises aren't serious and will go away on their own in 2 to 4 weeks. But sometimes a more serious hand injury might not heal on its own. Tell your doctor if you have new symptoms or your injury is not getting better over time. You may have tests to see if you have bone or nerve damage. These tests may include X-rays, a CT scan, or an MRI. If you damaged bones or muscles, you may need more treatment. The doctor has checked you carefully, but problems can develop later. If you notice any problems or new symptoms, get medical treatment right away. Follow-up care is a key part of your treatment and safety. Be sure to make and go to all appointments, and call your doctor if you are having problems. It's also a good idea to know your test results and keep a list of the medicines you take. How can you care for yourself at home? · Put ice or a cold pack on the hand for 10 to 20 minutes at a time.  Put a thin cloth between the ice and your skin. · Prop up your hand on a pillow when you ice it or anytime you sit or lie down during the next 3 days. Try to keep your hand above the level of your heart. This will help reduce swelling. · Be safe with medicines. Read and follow all instructions on the label. ? If the doctor gave you a prescription medicine for pain, take it as prescribed. ? If you are not taking a prescription pain medicine, ask your doctor if you can take an over-the-counter medicine. · Be sure to follow your doctor's advice about moving and exercising your injured hand. When should you call for help? Call your doctor now or seek immediate medical care if:    · Your pain gets worse.     · You have new or worse swelling.     · You have tingling, weakness, or numbness in the area near the bruise.     · The area near the bruise is cold or pale.     · You have symptoms of infection, such as:  ? Increased pain, swelling, warmth, or redness. ? Red streaks leading from the area. ? Pus draining from the area. ? A fever.    Watch closely for changes in your health, and be sure to contact your doctor if:    · You do not get better as expected. Where can you learn more? Go to http://emily-julius.info/. Enter A743 in the search box to learn more about \"Hand Bruises: Care Instructions. \"  Current as of: June 26, 2019  Content Version: 12.2  © 3574-3482 [employer]. Care instructions adapted under license by [employer] (which disclaims liability or warranty for this information). If you have questions about a medical condition or this instruction, always ask your healthcare professional. [address] any warranty or liability for your use of this information.

## 2019-11-04 NOTE — ED PROVIDER NOTES
EMERGENCY DEPARTMENT HISTORY AND PHYSICAL EXAM    5:29 PM      Date: 11/4/2019  Patient Name: Rose Marie Seals    History of Presenting Illness     Chief Complaint   Patient presents with    Hand Pain       History Provided By: Patient    Chief Complaint: right hand pain, swelling  Duration: 1 Days  Timing:  Acute  Location:   Quality: Aching  Severity: Moderate  Modifying Factors: none  Associated Symptoms: denies any other associated signs or symptoms      Additional History (Context):Marcie Elaine is a 26 y.o. female with a pertinent history of anemia, astigmatism who presents to the emergency department for evaluation of right hand pain and swelling x1 day. Patient reports she was lifting a TV when it fell in her hand. She has been applying ice without relief in her symptoms. She is able to straighten her fingers out, but states she has been having increased difficulty making a fist.  No recent illnesses or other injuries. PCP:  Shirlene Epley, MD      Current Outpatient Medications   Medication Sig Dispense Refill    ibuprofen (MOTRIN) 800 mg tablet Take 1 Tab by mouth every six (6) hours as needed for Pain for up to 7 days. 20 Tab 0    acetaminophen (TYLENOL) 325 mg tablet Take 2 Tabs by mouth every four (4) hours as needed for Pain. 20 Tab 0    HYDROcodone-acetaminophen (NORCO) 5-325 mg per tablet Take 1 Tab by mouth every four (4) hours as needed for Pain. Max Daily Amount: 6 Tabs. 20 Tab 0       Past History     Past Medical History:  Past Medical History:   Diagnosis Date    Anemia     Astigmatism        Past Surgical History:  Past Surgical History:   Procedure Laterality Date    HX GYN      csection       Family History:  History reviewed. No pertinent family history. Social History:  Social History     Tobacco Use    Smoking status: Current Every Day Smoker     Packs/day: 0.25    Smokeless tobacco: Never Used   Substance Use Topics    Alcohol use: No    Drug use:  No Allergies:  No Known Allergies      Review of Systems       Review of Systems   Constitutional: Negative for chills and fever. HENT: Negative for congestion, rhinorrhea and sore throat. Respiratory: Negative for cough and shortness of breath. Cardiovascular: Negative for chest pain. Gastrointestinal: Negative for abdominal pain, blood in stool, constipation, diarrhea, nausea and vomiting. Genitourinary: Negative for dysuria, frequency and hematuria. Musculoskeletal: Positive for arthralgias and joint swelling. Negative for back pain and myalgias. Skin: Negative for rash and wound. Neurological: Negative for dizziness and headaches. All other systems reviewed and are negative. Physical Exam     Visit Vitals  /79 (BP 1 Location: Left arm, BP Patient Position: At rest)   Pulse 91   Temp 98.4 °F (36.9 °C)   Resp 14   Ht 5' 5\" (1.651 m)   Wt 99.8 kg (220 lb)   SpO2 100%   BMI 36.61 kg/m²       Physical Exam   Constitutional: She is oriented to person, place, and time. She appears well-developed and well-nourished. No distress. HENT:   Head: Normocephalic and atraumatic. Eyes: Conjunctivae are normal.   Neck: Normal range of motion. Neck supple. Cardiovascular: Normal rate, regular rhythm and normal heart sounds. Pulmonary/Chest: Effort normal and breath sounds normal. No respiratory distress. She exhibits no tenderness. Musculoskeletal: She exhibits edema and tenderness. She exhibits no deformity. Erythema and edema noted to dorsal aspect of right hand with tenderness to palpation. Intact extension. Decreased flexion secondary to swelling. Intact distal sensation and cap refill < 2 sec. Neurological: She is alert and oriented to person, place, and time. She has normal reflexes. Skin: Skin is warm and dry. She is not diaphoretic. Psychiatric: She has a normal mood and affect. Nursing note and vitals reviewed.       Diagnostic Study Results     Labs -  No results found for this or any previous visit (from the past 12 hour(s)). Radiologic Studies -   Xr Hand Rt Min 3 V    Result Date: 11/4/2019  EXAM:  XR HAND RT MIN 3 V INDICATION:  trauma COMPARISON: None. FINDINGS: 3  views of the right hand demonstrate no fracture or other osseous, articular or soft tissue foreign body. There is moderate soft tissue swelling on the dorsum of the hand. IMPRESSION: No fracture or dislocation. Moderate soft tissue swelling       Medical Decision Making   I am the first provider for this patient. I reviewed the vital signs, available nursing notes, past medical history, past surgical history, family history and social history. Vital Signs-Reviewed the patient's vital signs. Pulse Oximetry Analysis -  100% on room air (Interpretation)    Records Reviewed: Nursing Notes and Old Medical Records (Time of Review: 5:29 PM)    ED Course: Progress Notes, Reevaluation, and Consults:    Provider Notes (Medical Decision Making):   differential diagnosis: Fracture, dislocation, contusion, hematoma, sprain    Plan: Patient presents ambulatory no significant distress with normal vitals exam and HPI consistent with contusion. X-ray negative for acute fracture or dislocation. Placed in Ace wrap by myself. Will discharge home with Motrin. Patient has been advised to use ice. Follow-up with Ortho if no improvement in 1 week. At this time, patient is stable and appropriate for discharge home. Patient demonstrates understanding of current diagnoses and is in agreement with the treatment plan. They are advised that while the likelihood of serious underlying condition is low at this point given the evaluation performed today, we cannot fully rule it out. They are advised to immediately return with any new symptoms or worsening of current condition. All questions have been answered.   Patient is given educational material regarding their diagnoses, including danger symptoms and when to return to the ED. Diagnosis     Clinical Impression:   1. Contusion of right hand, initial encounter        Disposition:DC Home    Follow-up Information     Follow up With Specialties Details Why [address] [facility] Call in 2 days  [address], [address] [address]  [phone]    [facility] EMERGENCY DEPT Emergency Medicine Go to As needed, If symptoms worsen [address] [address]  [phone]           Patient's Medications   Start Taking    IBUPROFEN (MOTRIN) 800 MG TABLET    Take 1 Tab by mouth every six (6) hours as needed for Pain for up to 7 days. Continue Taking    ACETAMINOPHEN (TYLENOL) 325 MG TABLET    Take 2 Tabs by mouth every four (4) hours as needed for Pain. HYDROCODONE-ACETAMINOPHEN (NORCO) 5-325 MG PER TABLET    Take 1 Tab by mouth every four (4) hours as needed for Pain. Max Daily Amount: 6 Tabs. These Medications have changed    No medications on file   Stop Taking    No medications on file     _______________________________    This note was dictated utilizing voice recognition software which may lead to typographical errors. I apologize in advance if the situation occurs. If questions arise please do not hesitate to contact me or call our department.   Petty Hess PA-C

## 2019-11-07 ENCOUNTER — APPOINTMENT (OUTPATIENT)
Dept: GENERAL RADIOLOGY | Age: 29
End: 2019-11-07
Attending: PHYSICIAN ASSISTANT
Payer: COMMERCIAL

## 2019-11-07 ENCOUNTER — HOSPITAL ENCOUNTER (EMERGENCY)
Age: 29
Discharge: HOME | End: 2019-11-07
Attending: EMERGENCY MEDICINE
Payer: COMMERCIAL

## 2019-11-07 VITALS
HEART RATE: 86 BPM | TEMPERATURE: 98.3 F | DIASTOLIC BLOOD PRESSURE: 78 MMHG | OXYGEN SATURATION: 100 % | WEIGHT: 210 LBS | SYSTOLIC BLOOD PRESSURE: 125 MMHG | RESPIRATION RATE: 16 BRPM | HEIGHT: 65 IN | BODY MASS INDEX: 34.99 KG/M2

## 2019-11-07 DIAGNOSIS — S60.221A CONTUSION OF RIGHT HAND, INITIAL ENCOUNTER: Primary | ICD-10-CM

## 2019-11-07 PROCEDURE — 99283 EMERGENCY DEPT VISIT LOW MDM: CPT

## 2019-11-07 PROCEDURE — 73130 X-RAY EXAM OF HAND: CPT

## 2019-11-07 RX ORDER — NAPROXEN 500 MG/1
500 TABLET ORAL 2 TIMES DAILY WITH MEALS
Qty: 20 TAB | Refills: 0 | Status: SHIPPED | OUTPATIENT
Start: 2019-11-07 | End: 2019-11-17

## 2019-11-07 NOTE — ED PROVIDER NOTES
HPI     Braeden Berger is a 26 y.o. female dilatory to ED C/o right hand pain and swelling. Symptoms started Sunday. Pt dropped a dresser on her right hand, was seen in the ED Monday, and the symptoms have progressively gotten worse. Patient was able to continue working her job at [employer], [address] [place] [address]. RT-hand dominant    Past Medical History:   Diagnosis Date    Anemia     Astigmatism        Past Surgical History:   Procedure Laterality Date    HX GYN      csection         History reviewed. No pertinent family history.     Social History     Socioeconomic History    Marital status: SINGLE     Spouse name: Not on file    Number of children: Not on file    Years of education: Not on file    Highest education level: Not on file   Occupational History    Not on file   Social Needs    Financial resource strain: Not on file    Food insecurity:     Worry: Not on file     Inability: Not on file    Transportation needs:     Medical: Not on file     Non-medical: Not on file   Tobacco Use    Smoking status: Current Every Day Smoker     Packs/day: 0.25    Smokeless tobacco: Never Used    Tobacco comment: 2 black and milds per day   Substance and Sexual Activity    Alcohol use: No    Drug use: No    Sexual activity: Yes     Partners: Male     Birth control/protection: Condom   Lifestyle    Physical activity:     Days per week: Not on file     Minutes per session: Not on file    Stress: Not on file   Relationships    Social connections:     Talks on phone: Not on file     Gets together: Not on file     Attends Judaism service: Not on file     Active member of club or organization: Not on file     Attends meetings of clubs or organizations: Not on file     Relationship status: Not on file    Intimate partner violence:     Fear of current or ex partner: Not on file     Emotionally abused: Not on file     Physically abused: Not on file     Forced sexual activity: Not on file   Other Topics Concern    Not on file   Social History Narrative    Not on file         ALLERGIES: Patient has no known allergies. Review of Systems   Constitutional: Negative. Respiratory: Negative. Cardiovascular: Negative. Gastrointestinal: Negative. Musculoskeletal: Positive for arthralgias (RT hand injury) and joint swelling (RT hand swelling). Negative for gait problem. Skin: Positive for color change (RT hand bruising). Neurological: Negative for weakness and numbness. All other systems reviewed and are negative. Vitals:    11/07/19 1342   BP: 125/78   Pulse: 86   Resp: 16   Temp: 98.3 °F (36.8 °C)   SpO2: 100%   Weight: 95.3 kg (210 lb)   Height: 5' 5\" (1.651 m)            Physical Exam   Constitutional: She is oriented to person, place, and time. She appears well-developed and well-nourished. No distress. HENT:   Head: Normocephalic and atraumatic. Eyes: Conjunctivae and EOM are normal.   Neck: Normal range of motion. Neck supple. Cardiovascular: Normal rate, regular rhythm, normal heart sounds and intact distal pulses. Pulmonary/Chest: Effort normal and breath sounds normal.   Musculoskeletal: Normal range of motion. She exhibits tenderness. She exhibits no edema or deformity. Right shoulder: Normal.        Right elbow: Normal.       Right wrist: Normal.        Right hand: She exhibits tenderness (most TTP is at 3rd MCP joint and MC, FROM with pain wo crepitus or clicking) and swelling. She exhibits normal range of motion, normal capillary refill, no deformity and no laceration. Normal sensation noted. Normal strength noted. Nl exam of [facility]   Neurological: She is alert and oriented to person, place, and time. Skin: Skin is warm and dry. Capillary refill takes less than 2 seconds. RT hand and wrist with red bruising/subQ hemorrhage. Mild calor. No wounds. No fluctuance, induration. Nursing note and vitals reviewed.        MDM  Number of Diagnoses or Management Options  Contusion of right hand, initial encounter:      Amount and/or Complexity of Data Reviewed  Tests in the radiology section of CPT®: ordered and reviewed           Procedures      ACE wrap Check Note  Patient: Jan Willingham  MRN: [id number]  Date: 11/7/2019  Age:  26 y.o.,      Sex: female    YOB: 1990      Location: RT hand  Applied by ED tech Marily Whiteside. neurovascular intact prior to splint placement neurovascular intact after splint placement splint is placed in good position. ANGELIKA Palumbo November 7, 2019 3:32 PM     Medications ordered:   Medications - No data to display     Lab findings:   Labs Reviewed - No data to display     EKG interpretation:   EKG Results     None           X-Ray, CT or other radiology findings or impressions:   XR HAND RT MIN 3 V    (Results Pending)   no acute findigns. radiology reading pending. ANGELIKA Palumbo 3:32 PM     Progress notes, Consult notes or additional Procedure notes:        Diagnosis:   1. Contusion of right hand, initial encounter          Disposition: home    INSTRUCTIONS FROM EMERGENCY ROOM PROVIDER:   Rest, ice and elevate affected extremity. Take Tylenol/Acetaminophen (every 4-6 hours) and/or Motrin/Ibuprofen/Advil (every 6-8 hours) or Naprosyn/Naproxyn/Aleve for fever or pain as needed. Follow up with your primary care provider or the provided referral for further evaluation and management. See orthopedic specialist in 1 to 2 weeks if not better with supportive care. Return to emergency room at once for worsening or new symptoms.        Follow-up Information     Follow up With Specialties Details Why Contact Info    Eleanor Cruz MD Obstetrics & Gynecology, Gynecology, Obstetrics In 1 week for recheck of current symptoms, As needed [id number] [phone]      [facility] EMERGENCY DEPT Emergency Medicine  As needed, If symptoms worsen [address] [facility]  In 1 week for further evaluation and treatment if not better with supportive care [address] [facility]          Patient's Medications   Start Taking    NAPROXEN (NAPROSYN) 500 MG TABLET    Take 1 Tab by mouth two (2) times daily (with meals) for 10 days. Continue Taking    ACETAMINOPHEN (TYLENOL) 325 MG TABLET    Take 2 Tabs by mouth every four (4) hours as needed for Pain. These Medications have changed    No medications on file   Stop Taking    No medications on file        Dictation disclaimer:  Please note that this dictation was completed with [employer], the computer voice recognition software. Quite often unanticipated grammatical, syntax, homophones, and other interpretive errors are inadvertently transcribed by the computer software. Please disregard these errors. Please excuse any errors that have escaped final proofreading.

## 2019-11-07 NOTE — ED NOTES
Patient to triage with right hand pain and swelling. Symptoms started Sunday. Pt dropped a dresser on her right hand, was seen in the ED Monday, and the symptoms have progressively gotten worse.

## 2019-11-07 NOTE — DISCHARGE INSTRUCTIONS
INSTRUCTIONS FROM EMERGENCY ROOM PROVIDER:   Rest, ice and elevate affected extremity. Take Tylenol/Acetaminophen (every 4-6 hours) and/or Motrin/Ibuprofen/Advil (every 6-8 hours) or Naprosyn/Naproxyn/Aleve for fever or pain as needed. Follow up with your primary care provider or the provided referral for further evaluation and management. See orthopedic specialist in 1 to 2 weeks if not better with supportive care. Return to emergency room at once for worsening or new symptoms.

## 2019-11-07 NOTE — ED NOTES
Patient discharged and given discharge instructions by ANGELIKA Garza. Patient ambulatory from ED. Patient accompanied by self.

## 2019-11-07 NOTE — LETTER
[address] [place] [address] [facility] EMERGENCY DEPT 
[address] [facility] [address] [phone] Work/School Note Date: 11/7/2019 To Whom It May concern: 
 
Angel Mitchell was seen and treated today in the emergency room by the following provider(s): 
Attending Provider: Tucker Gómez MD 
Physician Assistant: Pavithra Tavares. Angel Mitchell may return to work on 11/8/19. LIMITATIONS: Avoid use of affected extremity or body part , right hand , until follow up and/or until condition improves/resolves. Wear splint or Ace wrap until condition resolves.  
 
Sincerely, 
 
 
 
 
ANGELIKA Jung

## 2020-07-25 ENCOUNTER — HOSPITAL ENCOUNTER (EMERGENCY)
Age: 30
Discharge: LEFT BEFORE DISCHARGE | End: 2020-07-25
Attending: EMERGENCY MEDICINE
Payer: COMMERCIAL

## 2020-07-25 ENCOUNTER — APPOINTMENT (OUTPATIENT)
Dept: GENERAL RADIOLOGY | Age: 30
End: 2020-07-25
Attending: EMERGENCY MEDICINE
Payer: COMMERCIAL

## 2020-07-25 VITALS
RESPIRATION RATE: 16 BRPM | WEIGHT: 145 LBS | TEMPERATURE: 98.5 F | SYSTOLIC BLOOD PRESSURE: 148 MMHG | HEART RATE: 80 BPM | DIASTOLIC BLOOD PRESSURE: 71 MMHG | HEIGHT: 65 IN | OXYGEN SATURATION: 100 % | BODY MASS INDEX: 24.16 KG/M2

## 2020-07-25 DIAGNOSIS — Z53.21 PATIENT LEFT WITHOUT BEING SEEN: Primary | ICD-10-CM

## 2020-07-25 PROCEDURE — 71045 X-RAY EXAM CHEST 1 VIEW: CPT

## 2020-07-25 PROCEDURE — 75810000275 HC EMERGENCY DEPT VISIT NO LEVEL OF CARE

## 2020-07-25 PROCEDURE — 87635 SARS-COV-2 COVID-19 AMP PRB: CPT

## 2020-07-26 NOTE — ED TRIAGE NOTES
Pt arrived by POV for sore throat, body aches and concern for COVID exposure. Pt reports her significant other tested positive for COVID 19 on Thursday of this week. Pt reports her symptoms began yesterday. Pt has not been tested. Pt has been taking tylenol without relief.

## 2020-07-28 LAB — SARS-COV-2, COV2NT: NOT DETECTED

## 2020-07-29 ENCOUNTER — PATIENT OUTREACH (OUTPATIENT)
Dept: CASE MANAGEMENT | Age: 30
End: 2020-07-29

## 2020-07-29 NOTE — PROGRESS NOTES
Date/Time:  7/29/2020 9:35 AM  Attempted to reach patient by telephone. Unable to leave HIPPA compliant message requesting a return call. Will attempt to reach patient again.

## 2020-07-31 ENCOUNTER — PATIENT OUTREACH (OUTPATIENT)
Dept: CASE MANAGEMENT | Age: 30
End: 2020-07-31

## 2020-07-31 NOTE — PROGRESS NOTES
Date/Time:  7/31/2020 1:32 PM  Attempted to reach patient by telephone. Unable to leave HIPPA compliant message requesting a return call. Patient resolved from Transition of Care episode on 7-31-20  No further outreach scheduled with this CTN/ACM. Episode of Care resolved.

## 2021-01-02 ENCOUNTER — HOSPITAL ENCOUNTER (EMERGENCY)
Age: 31
Discharge: HOME | End: 2021-01-02
Attending: EMERGENCY MEDICINE
Payer: COMMERCIAL

## 2021-01-02 VITALS
HEART RATE: 109 BPM | OXYGEN SATURATION: 100 % | DIASTOLIC BLOOD PRESSURE: 69 MMHG | RESPIRATION RATE: 16 BRPM | TEMPERATURE: 100.7 F | SYSTOLIC BLOOD PRESSURE: 124 MMHG

## 2021-01-02 DIAGNOSIS — J02.9 EXUDATIVE PHARYNGITIS: Primary | ICD-10-CM

## 2021-01-02 PROCEDURE — 74011250637 HC RX REV CODE- 250/637: Performed by: PHYSICIAN ASSISTANT

## 2021-01-02 PROCEDURE — 99282 EMERGENCY DEPT VISIT SF MDM: CPT

## 2021-01-02 PROCEDURE — 74011000250 HC RX REV CODE- 250: Performed by: PHYSICIAN ASSISTANT

## 2021-01-02 RX ORDER — DEXAMETHASONE SODIUM PHOSPHATE 4 MG/ML
10 INJECTION, SOLUTION INTRA-ARTICULAR; INTRALESIONAL; INTRAMUSCULAR; INTRAVENOUS; SOFT TISSUE
Status: COMPLETED | OUTPATIENT
Start: 2021-01-02 | End: 2021-01-02

## 2021-01-02 RX ORDER — ACETAMINOPHEN 500 MG
1000 TABLET ORAL
Status: COMPLETED | OUTPATIENT
Start: 2021-01-02 | End: 2021-01-02

## 2021-01-02 RX ORDER — LIDOCAINE HYDROCHLORIDE 20 MG/ML
SOLUTION OROPHARYNGEAL
Qty: 200 ML | Refills: 0 | Status: SHIPPED | OUTPATIENT
Start: 2021-01-02 | End: 2021-05-16

## 2021-01-02 RX ORDER — AMOXICILLIN 875 MG/1
875 TABLET, FILM COATED ORAL 2 TIMES DAILY
Qty: 20 TAB | Refills: 0 | Status: SHIPPED | OUTPATIENT
Start: 2021-01-02 | End: 2021-01-12

## 2021-01-02 RX ORDER — LIDOCAINE HYDROCHLORIDE 20 MG/ML
15 SOLUTION OROPHARYNGEAL AS NEEDED
Status: DISCONTINUED | OUTPATIENT
Start: 2021-01-02 | End: 2021-01-02 | Stop reason: HOSPADM

## 2021-01-02 RX ORDER — AMOXICILLIN 875 MG/1
875 TABLET, FILM COATED ORAL EVERY 12 HOURS
Status: DISCONTINUED | OUTPATIENT
Start: 2021-01-02 | End: 2021-01-02 | Stop reason: RX

## 2021-01-02 RX ORDER — AMOXICILLIN AND CLAVULANATE POTASSIUM 875; 125 MG/1; MG/1
1 TABLET, FILM COATED ORAL
Status: COMPLETED | OUTPATIENT
Start: 2021-01-02 | End: 2021-01-02

## 2021-01-02 RX ADMIN — LIDOCAINE HYDROCHLORIDE 15 ML: 20 SOLUTION ORAL; TOPICAL at 08:24

## 2021-01-02 RX ADMIN — ACETAMINOPHEN 1000 MG: 500 TABLET ORAL at 08:23

## 2021-01-02 RX ADMIN — DEXAMETHASONE SODIUM PHOSPHATE 10 MG: 4 INJECTION, SOLUTION INTRAMUSCULAR; INTRAVENOUS at 08:23

## 2021-01-02 RX ADMIN — AMOXICILLIN AND CLAVULANATE POTASSIUM 1 TABLET: 875; 125 TABLET, FILM COATED ORAL at 08:25

## 2021-01-02 NOTE — DISCHARGE INSTRUCTIONS
SPECIFIC PATIENT INSTRUCTIONS FROM THE PHYSICIAN WHO TREATED YOU IN THE ER TODAY:    Take antibiotics as directed. Finish an entire course! Increase intake of over-the-counter probiotics (probiotic supplements, yogurt) when taking antibiotics to prevent antibiotic associated diarrhea and yeast infection. Use secondary birth control when taking antibiotics (for females, if applicable)   Warm salt water gargles may be used as needed to sooth sore throat   Use over-the-counter Chloraseptics  Take Tylenol/Acetaminophen (every 4-6 hours) and/or Motrin/Ibuprofen/Advil (every 6-8 hours) or Naprosyn/Naproxyn/Aleve for fever or pain as needed. Follow up with your doctor or the provided referral for further evaluation and management  Return to emergency room for worsening or new symptoms.

## 2021-01-02 NOTE — ED PROVIDER NOTES
[facility] EMERGENCY DEPT      Rayo Spear is a 31 y.o. female with noted PMH to ED c/o sore throat x 2 days, BC powder taken last night did not help. Denies cough. 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 No other complaints. LMP 12/22/2020    Past Medical History:   Diagnosis Date    Anemia     Astigmatism         No Known Allergies    Social History     Socioeconomic History    Marital status: SINGLE     Spouse name: Not on file    Number of children: Not on file    Years of education: Not on file    Highest education level: Not on file   Occupational History    Not on file   Social Needs    Financial resource strain: Not on file    Food insecurity     Worry: Not on file     Inability: Not on file    Transportation needs     Medical: Not on file     Non-medical: Not on file   Tobacco Use    Smoking status: Current Every Day Smoker     Packs/day: 0.25    Smokeless tobacco: Never Used    Tobacco comment: 2 black and milds per day   Substance and Sexual Activity    Alcohol use: No    Drug use: No    Sexual activity: Yes     Partners: Male     Birth control/protection: Condom   Lifestyle    Physical activity     Days per week: Not on file     Minutes per session: Not on file    Stress: Not on file   Relationships    Social connections     Talks on phone: Not on file     Gets together: Not on file     Attends Confucianist service: Not on file     Active member of club or organization: Not on file     Attends meetings of clubs or organizations: Not on file     Relationship status: Not on file    Intimate partner violence     Fear of current or ex partner: Not on file     Emotionally abused: Not on file     Physically abused: Not on file     Forced sexual activity: Not on file   Other Topics Concern    Not on file   Social History Narrative    Not on file           REVIEW OF SYSTEMS:  Constitutional: + fever  Negative for chills, appetite change or diaphoresis.   HENT: + sore throat. Negative for voice change, drooling, congestion, rhinorrhea, ear pain  Neck: + swollen glands  Respiratory:  Negative for cough, chest tightness, wheezing, stridor. Cardiovascular:  Negative for chest pain or palpitations. Gastrointestinal:  Negative for abd pain, nausea, vomiting, diarrhea. Genitourinary:  Negative for dysuria or flank pain. Musculoskeletal:  Negative. Skin:  Negative for rash, wounds or pallor. Neurological: Negative. All other systems are negative    Visit Vitals  /69 (BP 1 Location: Left arm, BP Patient Position: At rest)   Pulse (!) 109   Temp (!) 100.7 °F (38.2 °C)   Resp 16   SpO2 100%        PHYSICAL EXAM:  Nursing notes and vital signs reviewed. CONSTITUTIONAL: temp 100.7F Alert, in no apparent distress;  well developed;  well nourished. Appears generally healthy but with cold-like symptoms  HEAD:  Normocephalic, atraumatic. EYES:  EOMI. Non-icteric sclera. Normal conjunctiva. ENTM:    Throat:  (+) erythema with tonsillar hypertrophy and exudate, +slightly muffled voice. no trismus, no mouth floor induration, patent airway, symmetry maintained. Controls secretions without difficulty. mucous membranes moist.   Nose:  No rhinorrhea, nasal phonation or sinus pain. Ears: normal ear canals and TM's   NECK:  +tender anterior LAD No rigidity, erythema, edema, muscular or midline tenderness   RESPIRATORY:  No cough Chest clear, equal breath sounds, good air movement. Speaking in full sentences  CARDIOVASCULAR: +tachycardia with   Regular rhythm. No murmurs, rubs, or gallops. GI:  abdomen soft and non-tender. No rebound or guarding. MS:  Normal inspection. EXT:  Nl inspection. Distal pulses intact. NEURO:  Moves all four extremities, and grossly normal motor exam.  SKIN:  No rashes;  Normal for age. PSYCH:  Alert and normal affect.     Abnormal lab results from this emergency department encounter:  Labs Reviewed - No data to display Radiologist and cardiologist interpretations if available at time of this note:  No orders to display         Medication(s) ordered for patient during this emergency visit encounter:  Medications   acetaminophen (TYLENOL) tablet 1,000 mg    dexamethasone (DECADRON) 4 mg/mL Oral 10 mg    lidocaine (XYLOCAINE) 2 % viscous solution 15 mL   amoxicillin-clavulanate (AUGMENTIN) 875-125 mg per tablet 1 Tab (originally ordered amoxicillin but changed to augmentin d/t availability per pharmacy)        DDx: pharyngitis, tonsillitis, laryngitis, URI, strep, mono, PTA, Bony's angina, reactive lymphadenopathy, sialadenitis, bronchoconstrictions      IMPRESSION AND MEDICAL DECISION MAKING:  Based upon the patient's presentation with noted HPI and PE, along with the work up done in the emergency department, I believe that the patient is having exudative tonsillitis/pharyngitis most likely d/t strep, no signs of airway compromise. The pt meets [facility] criteria. Will cover with antibiotics with the first dose in ED Also, provided PO decadron/lidocaine and Tylenol for symptomatic relief . Discussed results, care in ED and further care, f/u and s/s warranting return to ED. Pt understood and agreed to plan. Work note given      SPECIFIC PATIENT INSTRUCTIONS FROM THE PHYSICIAN WHO TREATED YOU IN THE ER TODAY:    Take antibiotics as directed. Finish an entire course! Increase intake of over-the-counter probiotics (probiotic supplements, yogurt) when taking antibiotics to prevent antibiotic associated diarrhea and yeast infection. Use secondary birth control when taking antibiotics (for females, if applicable)   Warm salt water gargles may be used as needed to sooth sore throat   Use over-the-counter Chloraseptics  Take Tylenol/Acetaminophen (every 4-6 hours) and/or Motrin/Ibuprofen/Advil (every 6-8 hours) or Naprosyn/Naproxyn/Aleve for fever or pain as needed.   Follow up with your doctor or the provided referral for further evaluation and management  Return to emergency room for worsening or new symptoms. Diagnosis:   1. Exudative pharyngitis          Disposition: home    Follow-up Information     Follow up With Specialties Details Why Contact Info    Austin Lucero MD Obstetrics & Gynecology, Gynecology, Obstetrics In 3 days for recheck of current symptoms [address] [facility] [phone]      [facility] EMERGENCY DEPT Emergency Medicine  As needed, If symptoms worsen [address] [place] [phone]  [phone]          Patient's Medications   Start Taking    AMOXICILLIN (AMOXIL) 875 MG TABLET    Take 1 Tab by mouth two (2) times a day for 10 days. LIDOCAINE (LIDOCAINE VISCOUS) 2 % SOLUTION    Put 10 mL in mouth and swish and spit out QAC and at bedtime   Continue Taking    ACETAMINOPHEN (TYLENOL) 325 MG TABLET    Take 2 Tabs by mouth every four (4) hours as needed for Pain.    These Medications have changed    No medications on file   Stop Taking    No medications on file

## 2021-01-02 NOTE — LETTER
[address] [place] [address] [facility] EMERGENCY DEPT 
[address] [facility] [address] [phone] Work/School Note Date: 1/2/2021 To Whom It May concern: 
 
Dania Thorpe was seen and treated today in the emergency room by the following provider(s): 
Attending Provider: Britt Molina MD 
Physician Assistant: Chip Garza, [address] [place] [address] Lisa. Dania Thorpe is excused from work/school on 1/2/2021 through 1/5/2021. She is medically clear to return to work/school on 1/6/2021.   
  
 
Sincerely, 
 
 
 
 
Arlette Burkitt, PA

## 2021-05-16 ENCOUNTER — HOSPITAL ENCOUNTER (EMERGENCY)
Age: 31
Discharge: HOME | End: 2021-05-16
Attending: EMERGENCY MEDICINE | Admitting: EMERGENCY MEDICINE
Payer: COMMERCIAL

## 2021-05-16 ENCOUNTER — HOSPITAL ENCOUNTER (EMERGENCY)
Dept: CT IMAGING | Age: 31
Discharge: HOME | End: 2021-05-16
Attending: PHYSICIAN ASSISTANT
Payer: COMMERCIAL

## 2021-05-16 VITALS
DIASTOLIC BLOOD PRESSURE: 69 MMHG | WEIGHT: 157 LBS | BODY MASS INDEX: 26.16 KG/M2 | RESPIRATION RATE: 18 BRPM | TEMPERATURE: 97.8 F | OXYGEN SATURATION: 100 % | HEIGHT: 65 IN | SYSTOLIC BLOOD PRESSURE: 130 MMHG | HEART RATE: 99 BPM

## 2021-05-16 DIAGNOSIS — R22.0 FACIAL SWELLING: ICD-10-CM

## 2021-05-16 DIAGNOSIS — K04.7 DENTAL ABSCESS: Primary | ICD-10-CM

## 2021-05-16 LAB
ANION GAP SERPL CALC-SCNC: 7 MMOL/L (ref 3–18)
BASOPHILS # BLD: 0 K/UL (ref 0–0.1)
BASOPHILS NFR BLD: 0 % (ref 0–2)
BUN SERPL-MCNC: 6 MG/DL (ref 7–18)
BUN/CREAT SERPL: 10 (ref 12–20)
CALCIUM SERPL-MCNC: 8.2 MG/DL (ref 8.5–10.1)
CHLORIDE SERPL-SCNC: 106 MMOL/L (ref 100–111)
CO2 SERPL-SCNC: 24 MMOL/L (ref 21–32)
CREAT SERPL-MCNC: 0.62 MG/DL (ref 0.6–1.3)
DIFFERENTIAL METHOD BLD: ABNORMAL
EOSINOPHIL # BLD: 0.1 K/UL (ref 0–0.4)
EOSINOPHIL NFR BLD: 1 % (ref 0–5)
ERYTHROCYTE [DISTWIDTH] IN BLOOD BY AUTOMATED COUNT: 16.2 % (ref 11.6–14.5)
GLUCOSE SERPL-MCNC: 81 MG/DL (ref 74–99)
HCG UR QL: NEGATIVE
HCT VFR BLD AUTO: 31.6 % (ref 35–45)
HGB BLD-MCNC: 10 G/DL (ref 12–16)
LYMPHOCYTES # BLD: 2.7 K/UL (ref 0.9–3.6)
LYMPHOCYTES NFR BLD: 26 % (ref 21–52)
MCH RBC QN AUTO: 23.8 PG (ref 24–34)
MCHC RBC AUTO-ENTMCNC: 31.6 G/DL (ref 31–37)
MCV RBC AUTO: 75.1 FL (ref 74–97)
MONOCYTES # BLD: 0.9 K/UL (ref 0.05–1.2)
MONOCYTES NFR BLD: 8 % (ref 3–10)
NEUTS SEG # BLD: 6.7 K/UL (ref 1.8–8)
NEUTS SEG NFR BLD: 64 % (ref 40–73)
PLATELET # BLD AUTO: 432 K/UL (ref 135–420)
PMV BLD AUTO: 8.3 FL (ref 9.2–11.8)
POTASSIUM SERPL-SCNC: 3.4 MMOL/L (ref 3.5–5.5)
RBC # BLD AUTO: 4.21 M/UL (ref 4.2–5.3)
SODIUM SERPL-SCNC: 137 MMOL/L (ref 136–145)
WBC # BLD AUTO: 10.5 K/UL (ref 4.6–13.2)

## 2021-05-16 PROCEDURE — 85025 COMPLETE CBC W/AUTO DIFF WBC: CPT

## 2021-05-16 PROCEDURE — 96374 THER/PROPH/DIAG INJ IV PUSH: CPT

## 2021-05-16 PROCEDURE — 81025 URINE PREGNANCY TEST: CPT

## 2021-05-16 PROCEDURE — 99283 EMERGENCY DEPT VISIT LOW MDM: CPT

## 2021-05-16 PROCEDURE — 70450 CT HEAD/BRAIN W/O DYE: CPT

## 2021-05-16 PROCEDURE — 74011000636 HC RX REV CODE- 636: Performed by: PHYSICIAN ASSISTANT

## 2021-05-16 PROCEDURE — 70487 CT MAXILLOFACIAL W/DYE: CPT

## 2021-05-16 PROCEDURE — 80048 BASIC METABOLIC PNL TOTAL CA: CPT

## 2021-05-16 PROCEDURE — 74011250636 HC RX REV CODE- 250/636: Performed by: PHYSICIAN ASSISTANT

## 2021-05-16 RX ORDER — DEXAMETHASONE SODIUM PHOSPHATE 4 MG/ML
4 INJECTION, SOLUTION INTRA-ARTICULAR; INTRALESIONAL; INTRAMUSCULAR; INTRAVENOUS; SOFT TISSUE ONCE
Status: COMPLETED | OUTPATIENT
Start: 2021-05-16 | End: 2021-05-16

## 2021-05-16 RX ADMIN — DEXAMETHASONE SODIUM PHOSPHATE 4 MG: 4 INJECTION, SOLUTION INTRAMUSCULAR; INTRAVENOUS at 17:02

## 2021-05-16 RX ADMIN — IOPAMIDOL 100 ML: 612 INJECTION, SOLUTION INTRAVENOUS at 15:26

## 2021-05-16 NOTE — ED NOTES
I performed a brief evaluation, including history and physical, of the patient here in triage and I have determined that pt will need further treatment and evaluation from the main side ER physician. I have placed initial orders to help in expediting patients care. May 16, 2021 at 1:35 PM - ANGELIKA Sandoval      CC: Patient presents with a right sided facial swelling secondary to dental problems currently completed 3 days of clindamycin 600 mg every 6 hours, did not miss any doses. Reports pain with mouth opening and cannot fully open her mouth in triage. Visit Vitals  /69 (BP 1 Location: Left upper arm, BP Patient Position: At rest)   Pulse 99   Temp 97.8 °F (36.6 °C)   Resp 18   Ht 5' 5\" (1.651 m)   Wt 71.2 kg (157 lb)   SpO2 100%   BMI 26.13 kg/m²         ORDERS PLACED BY PIT:  Medications - No data to display     Labs Reviewed   METABOLIC PANEL, BASIC   CBC WITH AUTOMATED DIFF   HCG URINE, QL        EKG Results     None         CT HEAD FACIAL W WO CONT    (Results Pending)       Dictation disclaimer:  Please note that this dictation was completed with [employer], the [employer] voice recognition software. Quite often unanticipated grammatical, syntax, homophones, and other interpretive errors are inadvertently transcribed by the computer software. Please disregard these errors. Please excuse any errors that have escaped final proofreading.

## 2021-05-16 NOTE — ED TRIAGE NOTES
Patiente saw dentist on Friday, placed on clindamycin, patient with persistent right side facial swelling

## 2021-05-16 NOTE — ED PROVIDER NOTES
EMERGENCY DEPARTMENT HISTORY AND PHYSICAL EXAM    Date: 5/16/2021  Patient Name: Ross Lewis    History of Presenting Illness     Chief Complaint:   Chief Complaint   Patient presents with    Dental Pain     History Provided By: Patient    Additional History (Context): Ross Lewis is a 27 y.o. female presents ambulatory C/o right sided facial swelling secondary to dental problems, swelling developed gradually in the past 2 days. She is on antibiotics currently from her dentist, she completed 3 days of clindamycin 600 mg every 6 hours, did not miss any doses. Reports pain with mouth opening and cannot fully open her mouth in triage. Denies swelling of the tongue or throat. Denies inability to control secretions. Denies fever, chills, ear pain, nasal congestion, swollen lymph nodes, chest pain, shortness of breath. Patient was referred to oral surgeon by her dentist and is supposed to have an appointment secured, she is expecting a phone call from oral surgeon tomorrow with exact date and time of her appointment. PCP: Ana Javier MD        Past History     Past Medical History:  Past Medical History:   Diagnosis Date    Anemia     Astigmatism        Past Surgical History:  Past Surgical History:   Procedure Laterality Date    HX GYN      csection       Family History:  History reviewed. No pertinent family history. Social History:  Social History     Tobacco Use    Smoking status: Current Every Day Smoker     Packs/day: 0.25    Smokeless tobacco: Never Used    Tobacco comment: 2 black and milds per day   Substance Use Topics    Alcohol use: No    Drug use: No       Allergies:  No Known Allergies      Review of Systems   Review of Systems   Constitutional: Negative for activity change, appetite change, chills and fever. HENT: Positive for dental problem and facial swelling. Negative for congestion, ear pain, sinus pressure, sinus pain, sore throat and trouble swallowing.     Eyes: Negative. Respiratory: Negative for shortness of breath. Cardiovascular: Negative for chest pain and palpitations. Gastrointestinal: Negative for diarrhea, nausea and vomiting. Genitourinary: Negative for dysuria. Musculoskeletal: Negative. Neurological: Negative for dizziness, syncope and headaches. All other systems reviewed and are negative. All Other Systems Negative  Physical Exam     Vitals:    05/16/21 1330   BP: 130/69   Pulse: 99   Resp: 18   Temp: 97.8 °F (36.6 °C)   SpO2: 100%   Weight: 71.2 kg (157 lb)   Height: 5' 5\" (1.651 m)     Physical Exam  Vitals signs and nursing note reviewed. Constitutional:       General: She is not in acute distress. Appearance: She is well-developed. She is not toxic-appearing or diaphoretic. HENT:      Head: Normocephalic and atraumatic. Jaw: No trismus (pain with mouth opening but no trismus, no mouth floor induration). Salivary Glands: Right salivary gland is not diffusely enlarged or tender. Left salivary gland is not diffusely enlarged or tender. Right Ear: Tympanic membrane, ear canal and external ear normal.      Left Ear: Tympanic membrane, ear canal and external ear normal.      Nose: Nose normal. No congestion or rhinorrhea. Comments: No sinus tenderness     Mouth/Throat:      Lips: Pink. Mouth: Mucous membranes are moist.      Dentition: Abnormal dentition. Dental tenderness, gingival swelling and dental caries present. Pharynx: Oropharynx is clear. Uvula midline. No uvula swelling. Comments: Control secretions without difficulty  Eyes:      Extraocular Movements: Extraocular movements intact. Conjunctiva/sclera: Conjunctivae normal.      Pupils: Pupils are equal, round, and reactive to light. Comments: No periorbital findings   Neck:      Musculoskeletal: Normal range of motion and neck supple. Cardiovascular:      Rate and Rhythm: Normal rate and regular rhythm.       Pulses: Normal pulses. Heart sounds: Normal heart sounds. Pulmonary:      Effort: Pulmonary effort is normal.      Breath sounds: Normal breath sounds. Musculoskeletal: Normal range of motion. Lymphadenopathy:      Cervical: No cervical adenopathy. Skin:     General: Skin is warm and dry. Neurological:      Mental Status: She is alert and oriented to person, place, and time. Diagnostic Study Results     Labs -     Recent Results (from the past 12 hour(s))   METABOLIC PANEL, BASIC    Collection Time: 05/16/21  1:30 PM   Result Value Ref Range    Sodium 137 136 - 145 mmol/L    Potassium 3.4 (L) 3.5 - 5.5 mmol/L    Chloride 106 100 - 111 mmol/L    CO2 24 21 - 32 mmol/L    Anion gap 7 3.0 - 18 mmol/L    Glucose 81 74 - 99 mg/dL    BUN 6 (L) 7.0 - 18 MG/DL    Creatinine 0.62 0.6 - 1.3 MG/DL    BUN/Creatinine ratio 10 (L) 12 - 20      GFR est AA >60 >60 ml/min/1.73m2    GFR est non-AA >60 >60 ml/min/1.73m2    Calcium 8.2 (L) 8.5 - 10.1 MG/DL   CBC WITH AUTOMATED DIFF    Collection Time: 05/16/21  1:30 PM   Result Value Ref Range    WBC 10.5 4.6 - 13.2 K/uL    RBC 4.21 4.20 - 5.30 M/uL    HGB 10.0 (L) 12.0 - 16.0 g/dL    HCT 31.6 (L) 35.0 - 45.0 %    MCV 75.1 74.0 - 97.0 FL    MCH 23.8 (L) 24.0 - 34.0 PG    MCHC 31.6 31.0 - 37.0 g/dL    RDW 16.2 (H) 11.6 - 14.5 %    PLATELET 753 (H) 326 - 420 K/uL    MPV 8.3 (L) 9.2 - 11.8 FL    NEUTROPHILS 64 40 - 73 %    LYMPHOCYTES 26 21 - 52 %    MONOCYTES 8 3 - 10 %    EOSINOPHILS 1 0 - 5 %    BASOPHILS 0 0 - 2 %    ABS. NEUTROPHILS 6.7 1.8 - 8.0 K/UL    ABS. LYMPHOCYTES 2.7 0.9 - 3.6 K/UL    ABS. MONOCYTES 0.9 0.05 - 1.2 K/UL    ABS. EOSINOPHILS 0.1 0.0 - 0.4 K/UL    ABS. BASOPHILS 0.0 0.0 - 0.1 K/UL    DF AUTOMATED     HCG URINE, QL    Collection Time: 05/16/21  1:30 PM   Result Value Ref Range    HCG urine, QL Negative NEG         Radiologic Studies -   CT HEAD WO CONT   Final Result   No acute findings.       CT MAXILLOFACIAL W CONT   Final Result      Severe maxillary and mandibular dental disease with extensive areas of   peridental lucencies, bony erosions and loss of crowns. Findings most pronounced   on the right particularly involving the posterior right maxillary molar and   right posterior mandibular molars.   -Suspect small right soft tissue abscess adjacent to the mandibular molars.   -Suspect reactive adenopathy as discussed. CT Results  (Last 48 hours)               05/16/21 1525  CT HEAD WO CONT Final result    Impression:  No acute findings. Narrative:  EXAM: CT HEAD WO CONT       INDICATION: dental abscess       COMPARISON: None. TECHNIQUE: Unenhanced CT of the head was performed using 5 mm images. Brain and   bone windows were generated. CT dose reduction was achieved through use of a   standardized protocol tailored for this examination and automatic exposure   control for dose modulation. FINDINGS:   The ventricles and sulci are normal in size, shape and configuration and   midline. There is no significant white matter disease. There is no intracranial   hemorrhage, extra-axial collection, mass, mass effect or midline shift. The   basilar cisterns are open. No acute infarct is identified. The bone windows   demonstrate no abnormalities. The visualized portions of the paranasal sinuses   and mastoid air cells are clear. 05/16/21 1525  CT MAXILLOFACIAL W CONT Final result    Impression:      Severe maxillary and mandibular dental disease with extensive areas of   peridental lucencies, bony erosions and loss of crowns. Findings most pronounced   on the right particularly involving the posterior right maxillary molar and   right posterior mandibular molars.   -Suspect small right soft tissue abscess adjacent to the mandibular molars.   -Suspect reactive adenopathy as discussed. Narrative:  EXAM: CT MAXILLOFACIAL W CONT       INDICATION: dental abscess, swelling despite abx, trismus       COMPARISON: None.        CONTRAST: 100 mL of Isovue-300       TECHNIQUE:  Multislice helical CT of the facial bones was performed in the axial   plane during uneventful rapid bolus intravenous contrast administration. Coronal   and sagittal reformations were generated. CT dose reduction was achieved   through use of a standardized protocol tailored for this examination and   automatic exposure control for dose modulation. FINDINGS:       No fracture. Nasal septum midline. Peridental lucency surrounding the left first maxillary premolar. Peridental lucency and destruction surrounding the right most posterior   maxillary molar with extensive periosteal resorption. Loss of the crown noted. Peridental lucencies surrounding the right 2 most posterior mandibular molars   with impaction. Loss of the crowns noted. Periodontal lucency surrounding the left most posterior mandibular molars with   cortical erosions. Loss of normal crowns. There is a loss of the normal crown of   the left most posterior maxillary molar. Nonspecific right submandibular lymph nodes and submental lymph nodes, right of   midline, noted. They are presumably reactive. Suspect developing small abscess noted adjacent to the right mandibular soft   tissues measuring approximately 6 x 4 mm series 5 image 46. Visualized parotid glands and submandibular glands symmetric. Nonspecific cervical chain lymph nodes noted bilaterally. The visualized paranasal sinuses and mastoid air cells are clear. The globes, optic nerves and extraocular muscles are normal.       No abnormalities are identified within the visualized portions of the brain or   nasopharynx. Medical Decision Making   I am the first provider for this patient. I reviewed the vital signs, available nursing notes, past medical history, past surgical history, family history and social history.     Vital Signs-Reviewed the patient's vital signs. Records Reviewed: Nursing Notes    Procedures:  Procedures    Provider Notes (Medical Decision Making):     DDx: Odontalgia, Dental caries,  Periodontal disease, Periapical abscess, Trigeminal neuralgia,  space infection, Bony's angina, Retropharyngeal space infection, Infection after root canal, Dry Socket, Acute Necrotizing Ulcerative Gingivitis (ANUG). Nontoxic patient presents with complaints of facial swelling that gradually developed in the last 2 days, patient started antibiotics 2 days ago, clindamycin 600 mg 4 times a day, swelling got worse yesterday and slightly subsided today, patient did not skip doses of clindamycin. Patient keeps improving while in ED, got much better after Decadron dose. Her CT shows small abscess adjacent to molars, no obvious abscess detected on exam, therefore I&D will not be attempted. Patient is able to control secretions. Patient is in the process of securing an evaluation by oral surgeon, was referred by her dentist.  She is safe for discharge with follow-up with dental/oral surgeon as planned tomorrow. Consult:    Discussed care with Dr. Amarjit Sam ED attending who also saw the patient. Standard discussion; including history of patients chief complaint, available diagnostic results, and treatment course. PLAN: Agrees with plan. Kirstie Beltre PA-C       MED RECONCILIATION:  No current facility-administered medications for this encounter. No current outpatient medications on file. Disposition:  home    DISCHARGE NOTE:     Pt has been reexamined. Improved, swelling subsided during stay and after administration of Decadron. Patient has no new complaints, changes, or physical findings. Care plan outlined and precautions discussed. Results of [facility] CT were reviewed with the patient. All medications were reviewed with the patient; will d/c home. All of pt's questions and concerns were addressed.    Patient was instructed and agrees to follow up with dentist and oral surgeon, as well as to return to the ED upon further deterioration. Patient is ready to go home. Follow-up Information     Follow up With Specialties Details Why Contact Info    your dentist and oral surgeon   for further evaluation and treatment WITHOUT FAIL tomorrow. Failure to follow-up may result in complications, worsening evaluation, compromised airways and even death. [facility] Emergency Medicine  As needed, If symptoms worsen [address] [facility] [address]  [phone]          There are no discharge medications for this patient. Diagnosis     Clinical Impression:   1. Dental abscess    2. Facial swelling          Dictation disclaimer:  Please note that this dictation was completed with [employer], the computer voice recognition software. Quite often unanticipated grammatical, syntax, homophones, and other interpretive errors are inadvertently transcribed by the computer software. Please disregard these errors. Please excuse any errors that have escaped final proofreading.

## 2021-05-16 NOTE — DISCHARGE INSTRUCTIONS
Continue antibiotics clindamycin as directed by your dentist. Rodo Smith an entire course! Increase intake of over-the-counter probiotics (probiotic supplements, yogurt) when taking antibiotics to prevent antibiotic associated diarrhea and yeast infection. Use secondary birth control when taking antibiotics (for females, if applicable)   Contact your dentist and/oral surgeon you were referred to tomorrow morning without fail and secure close appointment. Return to emergency room immediately for any worsening or for lack of improvement. Such symptoms as inability to control secretions, inability to fully open mouth, increasing swelling despite antibiotics are among the symptoms that would warrant immediate return to ER.

## 2021-05-18 ENCOUNTER — HOSPITAL ENCOUNTER (EMERGENCY)
Age: 31
Discharge: STILL ADMITTED OR ADMINISTRATIVE | End: 2021-05-18
Attending: EMERGENCY MEDICINE

## 2021-05-18 VITALS
TEMPERATURE: 98.6 F | SYSTOLIC BLOOD PRESSURE: 151 MMHG | WEIGHT: 157 LBS | HEIGHT: 66 IN | DIASTOLIC BLOOD PRESSURE: 86 MMHG | RESPIRATION RATE: 17 BRPM | HEART RATE: 89 BPM | OXYGEN SATURATION: 100 % | BODY MASS INDEX: 25.23 KG/M2

## 2021-06-30 ENCOUNTER — HOSPITAL ENCOUNTER (EMERGENCY)
Age: 31
Discharge: HOME | End: 2021-06-30
Attending: EMERGENCY MEDICINE
Payer: COMMERCIAL

## 2021-06-30 ENCOUNTER — APPOINTMENT (OUTPATIENT)
Dept: GENERAL RADIOLOGY | Age: 31
End: 2021-06-30
Attending: EMERGENCY MEDICINE
Payer: COMMERCIAL

## 2021-06-30 VITALS
HEIGHT: 65 IN | DIASTOLIC BLOOD PRESSURE: 74 MMHG | BODY MASS INDEX: 25.83 KG/M2 | OXYGEN SATURATION: 100 % | HEART RATE: 100 BPM | RESPIRATION RATE: 18 BRPM | WEIGHT: 155 LBS | SYSTOLIC BLOOD PRESSURE: 121 MMHG | TEMPERATURE: 99.8 F

## 2021-06-30 DIAGNOSIS — J40 BRONCHITIS: Primary | ICD-10-CM

## 2021-06-30 DIAGNOSIS — Z20.822 SUSPECTED COVID-19 VIRUS INFECTION: ICD-10-CM

## 2021-06-30 LAB
COVID-19 RAPID TEST, COVR: NOT DETECTED
SOURCE, COVRS: NORMAL

## 2021-06-30 PROCEDURE — 99283 EMERGENCY DEPT VISIT LOW MDM: CPT

## 2021-06-30 PROCEDURE — 71046 X-RAY EXAM CHEST 2 VIEWS: CPT

## 2021-06-30 PROCEDURE — 94640 AIRWAY INHALATION TREATMENT: CPT

## 2021-06-30 PROCEDURE — 74011636637 HC RX REV CODE- 636/637: Performed by: EMERGENCY MEDICINE

## 2021-06-30 PROCEDURE — 74011000250 HC RX REV CODE- 250: Performed by: EMERGENCY MEDICINE

## 2021-06-30 PROCEDURE — 93005 ELECTROCARDIOGRAM TRACING: CPT

## 2021-06-30 PROCEDURE — 87635 SARS-COV-2 COVID-19 AMP PRB: CPT

## 2021-06-30 PROCEDURE — 74011250637 HC RX REV CODE- 250/637: Performed by: EMERGENCY MEDICINE

## 2021-06-30 RX ORDER — GUAIFENESIN/DEXTROMETHORPHAN 100-10MG/5
10 SYRUP ORAL
Status: COMPLETED | OUTPATIENT
Start: 2021-06-30 | End: 2021-06-30

## 2021-06-30 RX ORDER — IPRATROPIUM BROMIDE AND ALBUTEROL SULFATE 2.5; .5 MG/3ML; MG/3ML
3 SOLUTION RESPIRATORY (INHALATION) ONCE
Status: COMPLETED | OUTPATIENT
Start: 2021-06-30 | End: 2021-06-30

## 2021-06-30 RX ORDER — ALBUTEROL SULFATE 0.83 MG/ML
5 SOLUTION RESPIRATORY (INHALATION)
Status: COMPLETED | OUTPATIENT
Start: 2021-06-30 | End: 2021-06-30

## 2021-06-30 RX ORDER — ALBUTEROL SULFATE 90 UG/1
2 AEROSOL, METERED RESPIRATORY (INHALATION)
Qty: 1 INHALER | Refills: 0 | Status: SHIPPED | OUTPATIENT
Start: 2021-06-30

## 2021-06-30 RX ORDER — AMOXICILLIN AND CLAVULANATE POTASSIUM 875; 125 MG/1; MG/1
1 TABLET, FILM COATED ORAL 2 TIMES DAILY
Qty: 20 TABLET | Refills: 0 | Status: SHIPPED | OUTPATIENT
Start: 2021-06-30 | End: 2021-07-10

## 2021-06-30 RX ORDER — PREDNISONE 50 MG/1
50 TABLET ORAL DAILY
Qty: 5 TABLET | Refills: 0 | Status: SHIPPED | OUTPATIENT
Start: 2021-06-30 | End: 2021-07-05

## 2021-06-30 RX ORDER — GUAIFENESIN 600 MG/1
600 TABLET, EXTENDED RELEASE ORAL 2 TIMES DAILY
Qty: 20 TABLET | Refills: 0 | OUTPATIENT
Start: 2021-06-30 | End: 2022-02-26

## 2021-06-30 RX ORDER — ALBUTEROL SULFATE 0.83 MG/ML
2.5 SOLUTION RESPIRATORY (INHALATION)
Status: DISCONTINUED | OUTPATIENT
Start: 2021-06-30 | End: 2021-06-30

## 2021-06-30 RX ORDER — PREDNISONE 20 MG/1
60 TABLET ORAL
Status: COMPLETED | OUTPATIENT
Start: 2021-06-30 | End: 2021-06-30

## 2021-06-30 RX ADMIN — ALBUTEROL SULFATE 5 MG: 2.5 SOLUTION RESPIRATORY (INHALATION) at 08:53

## 2021-06-30 RX ADMIN — GUAIFENESIN AND DEXTROMETHORPHAN 10 ML: 100; 10 SYRUP ORAL at 08:48

## 2021-06-30 RX ADMIN — IPRATROPIUM BROMIDE AND ALBUTEROL SULFATE 3 ML: .5; 3 SOLUTION RESPIRATORY (INHALATION) at 08:50

## 2021-06-30 RX ADMIN — PREDNISONE 60 MG: 20 TABLET ORAL at 08:48

## 2021-06-30 NOTE — ED PROVIDER NOTES
EMERGENCY DEPARTMENT HISTORY AND PHYSICAL EXAM    Date: 6/30/2021  Patient Name: Chaya Gamez    History of Presenting Illness     Chief Complaint   Patient presents with    Cough         History Provided By: Patient      Additional History (Context): Chaya Gamez is a 27 y.o. female with anemia who presents with complaint of productive cough body aches congestion and wheezing. All of this began yesterday. Patient is not vaccinated for Covid. Denies fever. She smokes a black a mile a day. Denies history of asthma or COPD. PCP: Karin Duncan MD    Current Facility-Administered Medications   Medication Dose Route Frequency Provider Last Rate Last Admin    albuterol-ipratropium (DUO-NEB) 2.5 MG-0.5 MG/3 ML  3 mL Nebulization ONCE Lynda Maldonado PA        predniSONE (DELTASONE) tablet 60 mg  60 mg Oral NOW Lynda Maldonado PA        guaiFENesin-dextromethorphan (ROBITUSSIN DM) 100-10 mg/5 mL syrup 10 mL  10 mL Oral NOW Lynda Maldonado PA        albuterol (PROVENTIL VENTOLIN) nebulizer solution 5 mg  5 mg Nebulization NOW Lynda Maldonado PA         Current Outpatient Medications   Medication Sig Dispense Refill    albuterol (PROVENTIL HFA, VENTOLIN HFA, PROAIR HFA) 90 mcg/actuation inhaler Take 2 Puffs by inhalation every four (4) hours as needed for Wheezing. 1 Inhaler 0    predniSONE (DELTASONE) 50 mg tablet Take 1 Tablet by mouth daily for 5 days. 5 Tablet 0    guaiFENesin ER (Mucinex) 600 mg ER tablet Take 1 Tablet by mouth two (2) times a day. 20 Tablet 0    amoxicillin-clavulanate (Augmentin) 875-125 mg per tablet Take 1 Tablet by mouth two (2) times a day for 10 days. 20 Tablet 0       Past History     Past Medical History:  Past Medical History:   Diagnosis Date    Anemia     Astigmatism        Past Surgical History:  Past Surgical History:   Procedure Laterality Date    HX GYN      csection       Family History:  No family history on file.     Social History:  Social History     Tobacco Use  Smoking status: Current Every Day Smoker     Packs/day: 0.25    Smokeless tobacco: Never Used    Tobacco comment: 2 black and milds per day   Substance Use Topics    Alcohol use: No    Drug use: No       Allergies:  No Known Allergies      Review of Systems   Review of Systems   Constitutional: Negative for fever. HENT: Positive for congestion. Eyes: Negative. Respiratory: Positive for cough and wheezing. Cardiovascular: Negative. Gastrointestinal: Negative. Endocrine: Negative. Genitourinary: Negative. Musculoskeletal: Positive for myalgias. Skin: Negative. Allergic/Immunologic: Negative. Neurological: Negative. Hematological: Negative. Psychiatric/Behavioral: Negative. All Other Systems Negative  Physical Exam     Vitals:    06/30/21 0747   BP: 121/74   Pulse: 100   Resp: 18   Temp: 99.8 °F (37.7 °C)   SpO2: 100%   Weight: 70.3 kg (155 lb)   Height: 5' 5\" (1.651 m)     Physical Exam  Vitals and nursing note reviewed. Constitutional:       Appearance: She is well-developed and normal weight. HENT:      Head: Normocephalic and atraumatic. Eyes:      Pupils: Pupils are equal, round, and reactive to light. Neck:      Thyroid: No thyromegaly. Vascular: No JVD. Trachea: No tracheal deviation. Cardiovascular:      Rate and Rhythm: Normal rate and regular rhythm. Heart sounds: Normal heart sounds. No murmur heard. No friction rub. No gallop. Pulmonary:      Effort: Pulmonary effort is normal. No respiratory distress. Breath sounds: No stridor. Wheezing present. No rales. Chest:      Chest wall: No tenderness. Abdominal:      General: There is no distension. Palpations: Abdomen is soft. There is no mass. Tenderness: There is no abdominal tenderness. There is no guarding or rebound. Musculoskeletal:         General: No tenderness. Lymphadenopathy:      Cervical: No cervical adenopathy.    Skin:     General: Skin is warm and dry.      Coloration: Skin is not pale. Findings: No erythema or rash. Neurological:      Mental Status: She is alert and oriented to person, place, and time. Psychiatric:         Behavior: Behavior normal.         Thought Content: Thought content normal.            Diagnostic Study Results     Labs -     Recent Results (from the past 12 hour(s))   EKG, 12 LEAD, INITIAL    Collection Time: 06/30/21  7:53 AM   Result Value Ref Range    Ventricular Rate 96 BPM    Atrial Rate 96 BPM    P-R Interval 148 ms    QRS Duration 88 ms    Q-T Interval 334 ms    QTC Calculation (Bezet) 421 ms    Calculated P Axis 74 degrees    Calculated R Axis 71 degrees    Calculated T Axis 62 degrees    Diagnosis       Normal sinus rhythm  Normal ECG  When compared with ECG of 18-JAN-2016 00:44,  Nonspecific T wave abnormality now evident in Lateral leads         Radiologic Studies -   XR CHEST PA LAT    (Results Pending)     CT Results  (Last 48 hours)    None        CXR Results  (Last 48 hours)    None            Medical Decision Making   I am the first provider for this patient. I reviewed the vital signs, available nursing notes, past medical history, past surgical history, family history and social history. Vital Signs-Reviewed the patient's vital signs. Records Reviewed: Nursing Notes    Procedures:  Procedures    Provider Notes (Medical Decision Making): no definite infiltrate on CXR. Swab for covid, treat bronchitis.     MED RECONCILIATION:  Current Facility-Administered Medications   Medication Dose Route Frequency    albuterol-ipratropium (DUO-NEB) 2.5 MG-0.5 MG/3 ML  3 mL Nebulization ONCE    predniSONE (DELTASONE) tablet 60 mg  60 mg Oral NOW    guaiFENesin-dextromethorphan (ROBITUSSIN DM) 100-10 mg/5 mL syrup 10 mL  10 mL Oral NOW    albuterol (PROVENTIL VENTOLIN) nebulizer solution 5 mg  5 mg Nebulization NOW     Current Outpatient Medications   Medication Sig    albuterol (PROVENTIL HFA, VENTOLIN HFA, PROAIR HFA) 90 mcg/actuation inhaler Take 2 Puffs by inhalation every four (4) hours as needed for Wheezing.  predniSONE (DELTASONE) 50 mg tablet Take 1 Tablet by mouth daily for 5 days.  guaiFENesin ER (Mucinex) 600 mg ER tablet Take 1 Tablet by mouth two (2) times a day.  amoxicillin-clavulanate (Augmentin) 875-125 mg per tablet Take 1 Tablet by mouth two (2) times a day for 10 days. Disposition:  home    DISCHARGE NOTE:   8:30 AM    Pt has been reexamined. Patient has no new complaints, changes, or physical findings. Care plan outlined and precautions discussed. Results of cxr were reviewed with the patient. All medications were reviewed with the patient; will d/c home with see below. All of pt's questions and concerns were addressed. Patient was instructed and agrees to follow up with PCP, as well as to return to the ED upon further deterioration. Patient is ready to go home. Follow-up Information     Follow up With Specialties Details Why Contact Info    Sloan Dsouza MD Obstetrics & Gynecology, Gynecology, Obstetrics Schedule an appointment as soon as possible for a visit in 2 days  [address] [facility] EMERGENCY DEPT Emergency Medicine  If symptoms worsen return immediately [address] [place]  [phone]          Current Discharge Medication List      START taking these medications    Details   albuterol (PROVENTIL HFA, VENTOLIN HFA, PROAIR HFA) 90 mcg/actuation inhaler Take 2 Puffs by inhalation every four (4) hours as needed for Wheezing. Qty: 1 Inhaler, Refills: 0  Start date: 6/30/2021      predniSONE (DELTASONE) 50 mg tablet Take 1 Tablet by mouth daily for 5 days. Qty: 5 Tablet, Refills: 0  Start date: 6/30/2021, End date: 7/5/2021      guaiFENesin ER (Mucinex) 600 mg ER tablet Take 1 Tablet by mouth two (2) times a day.   Qty: 20 Tablet, Refills: 0  Start date: 6/30/2021      amoxicillin-clavulanate (Augmentin) 875-125 mg per tablet Take 1 Tablet by mouth two (2) times a day for 10 days. Qty: 20 Tablet, Refills: 0  Start date: 6/30/2021, End date: 7/10/2021               Diagnosis     Clinical Impression:   1. Bronchitis    2.  Suspected COVID-19 virus infection

## 2021-06-30 NOTE — ED TRIAGE NOTES
Pt reports productive cough that started yesterday along with chest tightness and shortness of breath.

## 2021-07-01 LAB
ATRIAL RATE: 96 BPM
CALCULATED P AXIS, ECG09: 74 DEGREES
CALCULATED R AXIS, ECG10: 71 DEGREES
CALCULATED T AXIS, ECG11: 62 DEGREES
DIAGNOSIS, 93000: NORMAL
P-R INTERVAL, ECG05: 148 MS
Q-T INTERVAL, ECG07: 334 MS
QRS DURATION, ECG06: 88 MS
QTC CALCULATION (BEZET), ECG08: 421 MS
VENTRICULAR RATE, ECG03: 96 BPM

## 2021-11-23 NOTE — ED NOTES
I have reviewed discharge instructions with the patient. The patient verbalized understanding. Pt  dc'd via ambulation with belongings. Patient armband removed and shredded. Reviewed, no changes necessary at this time.

## 2022-01-05 ENCOUNTER — HOSPITAL ENCOUNTER (OUTPATIENT)
Dept: LAB | Age: 32
Discharge: HOME | End: 2022-01-05
Payer: COMMERCIAL

## 2022-01-05 LAB — SARS-COV-2, NAA: NOT DETECTED

## 2022-01-05 PROCEDURE — U0003 INFECTIOUS AGENT DETECTION BY NUCLEIC ACID (DNA OR RNA); SEVERE ACUTE RESPIRATORY SYNDROME CORONAVIRUS 2 (SARS-COV-2) (CORONAVIRUS DISEASE [COVID-19]), AMPLIFIED PROBE TECHNIQUE, MAKING USE OF HIGH THROUGHPUT TECHNOLOGIES AS DESCRIBED BY CMS-2020-01-R: HCPCS

## 2022-02-21 ENCOUNTER — HOSPITAL ENCOUNTER (EMERGENCY)
Age: 32
Discharge: HOME | End: 2022-02-21
Attending: EMERGENCY MEDICINE
Payer: COMMERCIAL

## 2022-02-21 ENCOUNTER — APPOINTMENT (OUTPATIENT)
Dept: GENERAL RADIOLOGY | Age: 32
End: 2022-02-21
Attending: STUDENT IN AN ORGANIZED HEALTH CARE EDUCATION/TRAINING PROGRAM
Payer: COMMERCIAL

## 2022-02-21 VITALS
OXYGEN SATURATION: 100 % | WEIGHT: 147 LBS | BODY MASS INDEX: 24.49 KG/M2 | SYSTOLIC BLOOD PRESSURE: 137 MMHG | TEMPERATURE: 97.9 F | HEART RATE: 109 BPM | DIASTOLIC BLOOD PRESSURE: 75 MMHG | RESPIRATION RATE: 16 BRPM | HEIGHT: 65 IN

## 2022-02-21 DIAGNOSIS — J45.901 MILD ASTHMA WITH ACUTE EXACERBATION, UNSPECIFIED WHETHER PERSISTENT: Primary | ICD-10-CM

## 2022-02-21 PROCEDURE — 74011250636 HC RX REV CODE- 250/636: Performed by: STUDENT IN AN ORGANIZED HEALTH CARE EDUCATION/TRAINING PROGRAM

## 2022-02-21 PROCEDURE — 71046 X-RAY EXAM CHEST 2 VIEWS: CPT

## 2022-02-21 PROCEDURE — 94640 AIRWAY INHALATION TREATMENT: CPT

## 2022-02-21 PROCEDURE — 74011000250 HC RX REV CODE- 250: Performed by: EMERGENCY MEDICINE

## 2022-02-21 PROCEDURE — 99282 EMERGENCY DEPT VISIT SF MDM: CPT

## 2022-02-21 PROCEDURE — 74011000250 HC RX REV CODE- 250: Performed by: STUDENT IN AN ORGANIZED HEALTH CARE EDUCATION/TRAINING PROGRAM

## 2022-02-21 PROCEDURE — 93005 ELECTROCARDIOGRAM TRACING: CPT

## 2022-02-21 RX ORDER — DEXAMETHASONE SODIUM PHOSPHATE 4 MG/ML
16 INJECTION, SOLUTION INTRA-ARTICULAR; INTRALESIONAL; INTRAMUSCULAR; INTRAVENOUS; SOFT TISSUE
Status: COMPLETED | OUTPATIENT
Start: 2022-02-21 | End: 2022-02-21

## 2022-02-21 RX ORDER — IPRATROPIUM BROMIDE AND ALBUTEROL SULFATE 2.5; .5 MG/3ML; MG/3ML
3 SOLUTION RESPIRATORY (INHALATION)
Status: DISCONTINUED | OUTPATIENT
Start: 2022-02-21 | End: 2022-02-21

## 2022-02-21 RX ADMIN — DEXAMETHASONE SODIUM PHOSPHATE 16 MG: 4 INJECTION, SOLUTION INTRAMUSCULAR; INTRAVENOUS at 16:38

## 2022-02-21 RX ADMIN — IPRATROPIUM BROMIDE 1 DOSE: 0.5 SOLUTION RESPIRATORY (INHALATION) at 16:38

## 2022-02-21 RX ADMIN — IPRATROPIUM BROMIDE 1 DOSE: 0.5 SOLUTION RESPIRATORY (INHALATION) at 17:20

## 2022-02-21 NOTE — ED PROVIDER NOTES
Pt is a 31yo F with recent diagnosis of asthma 1 week ago who presents to the ED with continued shortness of breath despite use of her inhalers at home. Shortness of breath began late last night while she was getting ready for bed - no increased activity or exposure to smoke or other possible irritants. Currently describes feeling short of breath and repeated bouts of coughing. 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 Pt takes Ellipta nightly, however took it at 11am today because she was not feeling well. Last used her albuterol inhaler at 8am today. Denies sick contacts at home or work, fevers, sinus congestion, runny nose, sputum production. Asthma  This is a new problem. The current episode started 12 to 24 hours ago. The problem has not changed since onset. Associated symptoms include shortness of breath. Pertinent negatives include no abdominal pain and no headaches. The symptoms are aggravated by smoking. Nothing relieves the symptoms.         Past Medical History:   Diagnosis Date    Anemia     Astigmatism        Past Surgical History:   Procedure Laterality Date    HX GYN      csection         Family History:   Problem Relation Age of Onset    Heart Disease Maternal Grandmother     Heart Disease Maternal Grandfather     Heart Disease Paternal Grandmother     Heart Disease Paternal Grandfather        Social History     Socioeconomic History    Marital status: SINGLE     Spouse name: Not on file    Number of children: Not on file    Years of education: Not on file    Highest education level: Not on file   Occupational History    Not on file   Tobacco Use    Smoking status: Current Every Day Smoker     Packs/day: 0.25    Smokeless tobacco: Never Used    Tobacco comment: 2 black and milds per day   Substance and Sexual Activity    Alcohol use: No    Drug use: No    Sexual activity: Yes     Partners: Male     Birth control/protection: Condom   Other Topics Concern    Not on file   Social History Narrative    Not on file     Social Determinants of Health     Financial Resource Strain:     Difficulty of Paying Living Expenses: Not on file   Food Insecurity:     Worried About Running Out of Food in the Last Year: Not on file    Warren of Food in the Last Year: Not on file   Transportation Needs:     Lack of Transportation (Medical): Not on file    Lack of Transportation (Non-Medical): Not on file   Physical Activity:     Days of Exercise per Week: Not on file    Minutes of Exercise per Session: Not on file   Stress:     Feeling of Stress : Not on file   Social Connections:     Frequency of Communication with Friends and Family: Not on file    Frequency of Social Gatherings with Friends and Family: Not on file    Attends Worship Services: Not on file    Active Member of [address] [place] [address] [employer] or Organizations: Not on file    Attends Club or Organization Meetings: Not on file    Marital Status: Not on file   Intimate Partner Violence:     Fear of Current or Ex-Partner: Not on file    Emotionally Abused: Not on file    Physically Abused: Not on file    Sexually Abused: Not on file   Housing Stability:     Unable to Pay for Housing in the Last Year: Not on file    Number of [place] in the Last Year: Not on file    Unstable Housing in the Last Year: Not on file         ALLERGIES: Patient has no known allergies. Review of Systems   Constitutional: Negative for chills, diaphoresis, fatigue and fever. HENT: Negative for congestion, rhinorrhea, sneezing and sore throat. Eyes: Negative for visual disturbance. Respiratory: Positive for cough, chest tightness, shortness of breath and wheezing. Negative for stridor. Cardiovascular: Negative for palpitations and leg swelling. Gastrointestinal: Negative for abdominal pain, constipation, diarrhea, nausea and vomiting. Musculoskeletal: Negative for back pain. Neurological: Negative for dizziness, weakness, light-headedness, numbness and headaches. Vitals:    02/21/22 1523   BP: 137/75   Pulse: (!) 109   Resp: 16   Temp: 97.9 °F (36.6 °C)   SpO2: 100%   Weight: 66.7 kg (147 lb)   Height: 5' 5\" (1.651 m)            Physical Exam  Vitals and nursing note reviewed. Constitutional:       General: She is not in acute distress. Appearance: Normal appearance. She is not ill-appearing. HENT:      Head: Normocephalic and atraumatic. Right Ear: External ear normal.      Left Ear: External ear normal.      Nose: Nose normal. No rhinorrhea. Mouth/Throat:      Mouth: Mucous membranes are dry. Eyes:      Extraocular Movements: Extraocular movements intact. Conjunctiva/sclera: Conjunctivae normal.   Cardiovascular:      Rate and Rhythm: Regular rhythm. Tachycardia present. Pulses: Normal pulses. Heart sounds: Normal heart sounds. Pulmonary:      Effort: Pulmonary effort is normal. No respiratory distress. Breath sounds: No wheezing. Comments: Breath sounds decreased on R compared to L  Chest:      Chest wall: No tenderness. Skin:     General: Skin is warm and dry. Neurological:      Mental Status: She is alert. Motor: No weakness. Psychiatric:         Mood and Affect: Mood normal.         Behavior: Behavior normal.         Thought Content: Thought content normal.         Judgment: Judgment normal.          MDM  Number of Diagnoses or Management Options  Mild asthma with acute exacerbation, unspecified whether persistent  Diagnosis management comments: Pt is a 35 yo F newly diagnosed with asthma 1 week ago who presents to the ED with shortness of breath. Pt reports she used her Trelegy Ellipta inhaler at 11am this morning and her albuterol inhaler at 8am this morning without improvement in her symptoms.      Pt well appearing and in no acute respiratory distress upon presentation to the ED. Noted to be tachycardic to 109. Noted to have decreased breath sounds on the R compared to L, but no wheezing heard on auscultation or audibly. Otherwise relatively reassuring physical exam. Obtained CXR and EKG due to chest pain and continued cough and shortness of breath despite use of her inhalers. Began treatment with duoneb and dexamethasone. Repeat duoneb administered after only minor improvement with first dose. After second dose, patient much improved subjectively and with increased R sided air movement on auscultation. CXR showed no acute intrathoracic abnormalities. EKG showed NSR at 100 BPM with no evidence of arrhythmias, STEMI, or other concerning findings. Instructed patient to follow up with primary care doctor and pulmonologist for continued care and evaluation of her newly diagnosed asthma. Low concern at this time for other causes for her shortness of breath including cardiac or infectious etiologies. Patient given strict return precautions including worsening shortness of breath, feelings of her throat closing or swelling, chest pain, cough with increased sputum production, fevers, her inhalers not controlling her symptoms at home. Amount and/or Complexity of Data Reviewed  Tests in the radiology section of CPT®: reviewed      ED Course as of 02/21/22 1745   Mon Feb 21, 2022   1531 Pulse (Heart Rate)(!): 109  Noted to be tachycardic upon arrival to the ED []   1657 XR CHEST PA LAT  No acute intrathoracic abnormalities noted [JM]   1740 EKG, 12 LEAD, INITIAL  EKG showed NSR at 100 BPM on normal axis. No ST segment elevations, depressions, or abnormal T wave inversions. No evidence of brugada, ARVH, WPW, QT prolongation or other arrhthymias.  NH 1421, QRS 88, Qtc 425 []      ED Course User Index  [JM] Bernardo Mason DO       Procedures

## 2022-02-21 NOTE — DISCHARGE INSTRUCTIONS
You were seen and evaluated today in the ED for your shortness of breath despite using your inhalers at home. We obtained a chest x-ray and and EKG to rule out other causes for your symptoms. These were both reassuring against serious causes for your shortness of breath today. Please immediately return to the nearest ED if you experience any of the following symptoms: worsening shortness of breath, feelings of her throat closing or swelling, chest pain, cough with increased sputum production, fevers, your inhalers not controlling her symptoms at home, or any other new symptom which concerns you.

## 2022-02-21 NOTE — ED TRIAGE NOTES
Pt states she was recently dx with asthma, endorses SOB and chest tightness. Pt states she took her powder inhaler around 11am today and her albuterol around 8am today but has still had difficulty breathing. Pt speaking in full sentences in triage.

## 2022-02-22 LAB
ATRIAL RATE: 100 BPM
CALCULATED P AXIS, ECG09: 79 DEGREES
CALCULATED R AXIS, ECG10: 75 DEGREES
CALCULATED T AXIS, ECG11: 58 DEGREES
DIAGNOSIS, 93000: NORMAL
P-R INTERVAL, ECG05: 142 MS
Q-T INTERVAL, ECG07: 330 MS
QRS DURATION, ECG06: 88 MS
QTC CALCULATION (BEZET), ECG08: 425 MS
VENTRICULAR RATE, ECG03: 100 BPM

## 2022-02-26 ENCOUNTER — APPOINTMENT (OUTPATIENT)
Dept: GENERAL RADIOLOGY | Age: 32
End: 2022-02-26
Attending: PHYSICIAN ASSISTANT
Payer: COMMERCIAL

## 2022-02-26 ENCOUNTER — HOSPITAL ENCOUNTER (EMERGENCY)
Age: 32
Discharge: HOME | End: 2022-02-26
Attending: STUDENT IN AN ORGANIZED HEALTH CARE EDUCATION/TRAINING PROGRAM
Payer: COMMERCIAL

## 2022-02-26 VITALS
WEIGHT: 147 LBS | DIASTOLIC BLOOD PRESSURE: 74 MMHG | BODY MASS INDEX: 24.49 KG/M2 | SYSTOLIC BLOOD PRESSURE: 125 MMHG | TEMPERATURE: 97.9 F | OXYGEN SATURATION: 100 % | RESPIRATION RATE: 18 BRPM | HEART RATE: 80 BPM | HEIGHT: 65 IN

## 2022-02-26 DIAGNOSIS — R07.9 CHEST PAIN, UNSPECIFIED TYPE: Primary | ICD-10-CM

## 2022-02-26 LAB
ANION GAP SERPL CALC-SCNC: 2 MMOL/L (ref 3–18)
BASOPHILS # BLD: 0.1 K/UL (ref 0–0.1)
BASOPHILS NFR BLD: 1 % (ref 0–2)
BUN SERPL-MCNC: 7 MG/DL (ref 7–18)
BUN/CREAT SERPL: 11 (ref 12–20)
CALCIUM SERPL-MCNC: 8.7 MG/DL (ref 8.5–10.1)
CHLORIDE SERPL-SCNC: 108 MMOL/L (ref 100–111)
CO2 SERPL-SCNC: 27 MMOL/L (ref 21–32)
CREAT SERPL-MCNC: 0.65 MG/DL (ref 0.6–1.3)
DIFFERENTIAL METHOD BLD: ABNORMAL
EOSINOPHIL # BLD: 0.1 K/UL (ref 0–0.4)
EOSINOPHIL NFR BLD: 1 % (ref 0–5)
ERYTHROCYTE [DISTWIDTH] IN BLOOD BY AUTOMATED COUNT: 19.4 % (ref 11.6–14.5)
GLUCOSE SERPL-MCNC: 93 MG/DL (ref 74–99)
HCG SERPL QL: NEGATIVE
HCT VFR BLD AUTO: 33.5 % (ref 35–45)
HGB BLD-MCNC: 9.8 G/DL (ref 12–16)
IMM GRANULOCYTES # BLD AUTO: 0 K/UL (ref 0–0.04)
IMM GRANULOCYTES NFR BLD AUTO: 0 % (ref 0–0.5)
LYMPHOCYTES # BLD: 2.3 K/UL (ref 0.9–3.6)
LYMPHOCYTES NFR BLD: 21 % (ref 21–52)
MCH RBC QN AUTO: 20.8 PG (ref 24–34)
MCHC RBC AUTO-ENTMCNC: 29.3 G/DL (ref 31–37)
MCV RBC AUTO: 71 FL (ref 78–100)
MONOCYTES # BLD: 0.7 K/UL (ref 0.05–1.2)
MONOCYTES NFR BLD: 7 % (ref 3–10)
NEUTS SEG # BLD: 7.8 K/UL (ref 1.8–8)
NEUTS SEG NFR BLD: 70 % (ref 40–73)
NRBC # BLD: 0 K/UL (ref 0–0.01)
NRBC BLD-RTO: 0 PER 100 WBC
PLATELET # BLD AUTO: 427 K/UL (ref 135–420)
PMV BLD AUTO: 8.6 FL (ref 9.2–11.8)
POTASSIUM SERPL-SCNC: 4.4 MMOL/L (ref 3.5–5.5)
RBC # BLD AUTO: 4.72 M/UL (ref 4.2–5.3)
SODIUM SERPL-SCNC: 137 MMOL/L (ref 136–145)
TROPONIN-HIGH SENSITIVITY: <3 NG/L (ref 0–54)
WBC # BLD AUTO: 11 K/UL (ref 4.6–13.2)

## 2022-02-26 PROCEDURE — 93005 ELECTROCARDIOGRAM TRACING: CPT

## 2022-02-26 PROCEDURE — 84703 CHORIONIC GONADOTROPIN ASSAY: CPT

## 2022-02-26 PROCEDURE — 85025 COMPLETE CBC W/AUTO DIFF WBC: CPT

## 2022-02-26 PROCEDURE — 84484 ASSAY OF TROPONIN QUANT: CPT

## 2022-02-26 PROCEDURE — 71046 X-RAY EXAM CHEST 2 VIEWS: CPT

## 2022-02-26 PROCEDURE — 99285 EMERGENCY DEPT VISIT HI MDM: CPT

## 2022-02-26 PROCEDURE — 80048 BASIC METABOLIC PNL TOTAL CA: CPT

## 2022-02-26 NOTE — Clinical Note
[address] [place] [address] Dr [facility] EMERGENCY DEPT  [address] [address] [phone]    Work/School Note    Date: 2/26/2022    To Whom It May concern:      Nancy Dubon was seen and treated today in the emergency room by the following provider(s):  Attending Provider: Salvador Larry MD  Physician Assistant: Pavithra Clark. Nancy Dubon is excused from work/school on 02/26/22. She is clear to return to work/school on 02/27/22.         Sincerely,          ANGELIKA Suresh

## 2022-02-26 NOTE — ED PROVIDER NOTES
EMERGENCY DEPARTMENT HISTORY AND PHYSICAL EXAM    10:45 AM      Date: 2/26/2022  Patient Name: Kash Nye    History of Presenting Illness     Chief Complaint   Patient presents with    Chest Pain       History Provided By: Patient    Additional History (Context): Kash Nye is a 34 y.o. female with hx of asthma, anemia who presents with complaint of central chest pain x3 days. Patient's pain is aching, constant, and worse with eating. Denies alleviating factors. Denies diaphoresis, dyspnea, pleuritic or exertional symptoms, radiation of pain into neck or arm, nausea or vomiting. Denies history of cardiac disease, history of DVT or PE, hemoptysis, recent surgery or travel, leg swelling, OCP use, family hx of cardiac disease. Patient notes she does smoke. PCP: Osmin Bueno MD    Current Outpatient Medications   Medication Sig Dispense Refill    methylPREDNISolone (Medrol, Chuckie,) 4 mg tablet Please follow the instructions on the medrol dose pack. 1 Dose Pack 0    albuterol (PROVENTIL HFA, VENTOLIN HFA, PROAIR HFA) 90 mcg/actuation inhaler Take 2 Puffs by inhalation every four (4) hours as needed for Wheezing or Shortness of Breath. 1 Each 0    albuterol (PROVENTIL HFA, VENTOLIN HFA, PROAIR HFA) 90 mcg/actuation inhaler Take 2 Puffs by inhalation every four (4) hours as needed for Wheezing.  1 Inhaler 0       Past History     Past Medical History:  Past Medical History:   Diagnosis Date    Anemia     Astigmatism        Past Surgical History:  Past Surgical History:   Procedure Laterality Date    HX GYN      csection       Family History:  Family History   Problem Relation Age of Onset    Heart Disease Maternal Grandmother     Heart Disease Maternal Grandfather     Heart Disease Paternal Grandmother     Heart Disease Paternal Grandfather        Social History:  Social History     Tobacco Use    Smoking status: Current Every Day Smoker     Packs/day: 0.25    Smokeless tobacco: Never Used  Tobacco comment: 2 black and milds per day   Substance Use Topics    Alcohol use: No    Drug use: No       Allergies:  No Known Allergies      Review of Systems       Review of Systems   Constitutional: Negative for chills and fever. Respiratory: Negative for shortness of breath. Cardiovascular: Positive for chest pain. Gastrointestinal: Negative for abdominal pain, nausea and vomiting. Skin: Negative for rash. Neurological: Negative for weakness. All other systems reviewed and are negative. Physical Exam     Visit Vitals  /74 (BP 1 Location: Right upper arm, BP Patient Position: Sitting)   Pulse 80   Temp 97.9 °F (36.6 °C)   Resp 18   Ht 5' 5\" (1.651 m)   Wt 66.7 kg (147 lb)   SpO2 100%   BMI 24.46 kg/m²         Physical Exam  Vitals and nursing note reviewed. Constitutional:       General: She is not in acute distress. Appearance: She is well-developed. She is not ill-appearing, toxic-appearing or diaphoretic. HENT:      Head: Normocephalic and atraumatic. Cardiovascular:      Rate and Rhythm: Normal rate and regular rhythm. Heart sounds: Normal heart sounds. No murmur heard. No friction rub. No gallop. Pulmonary:      Effort: Pulmonary effort is normal. No respiratory distress. Breath sounds: Normal breath sounds. No wheezing or rales. Chest:      Chest wall: No mass, tenderness or edema. Abdominal:      Palpations: Abdomen is soft. There is no mass. Tenderness: There is no abdominal tenderness. There is no guarding or rebound. Musculoskeletal:         General: Normal range of motion. Cervical back: Normal range of motion and neck supple. Skin:     General: Skin is warm. Findings: No rash. Neurological:      Mental Status: She is alert.            Diagnostic Study Results     Labs -  Recent Results (from the past 12 hour(s))   EKG, 12 LEAD, INITIAL    Collection Time: 02/26/22 10:36 AM   Result Value Ref Range    Ventricular Rate 76 BPM    Atrial Rate 76 BPM    P-R Interval 136 ms    QRS Duration 92 ms    Q-T Interval 356 ms    QTC Calculation (Bezet) 400 ms    Calculated P Axis 51 degrees    Calculated R Axis 75 degrees    Calculated T Axis 64 degrees    Diagnosis       Normal sinus rhythm  Normal ECG  When compared with ECG of 21-FEB-2022 15:27,  No significant change was found     CBC WITH AUTOMATED DIFF    Collection Time: 02/26/22 10:40 AM   Result Value Ref Range    WBC 11.0 4.6 - 13.2 K/uL    RBC 4.72 4.20 - 5.30 M/uL    HGB 9.8 (L) 12.0 - 16.0 g/dL    HCT 33.5 (L) 35.0 - 45.0 %    MCV 71.0 (L) 78.0 - 100.0 FL    MCH 20.8 (L) 24.0 - 34.0 PG    MCHC 29.3 (L) 31.0 - 37.0 g/dL    RDW 19.4 (H) 11.6 - 14.5 %    PLATELET 423 (H) 920 - 420 K/uL    MPV 8.6 (L) 9.2 - 11.8 FL    NRBC 0.0 0  WBC    ABSOLUTE NRBC 0.00 0.00 - 0.01 K/uL    NEUTROPHILS 70 40 - 73 %    LYMPHOCYTES 21 21 - 52 %    MONOCYTES 7 3 - 10 %    EOSINOPHILS 1 0 - 5 %    BASOPHILS 1 0 - 2 %    IMMATURE GRANULOCYTES 0 0.0 - 0.5 %    ABS. NEUTROPHILS 7.8 1.8 - 8.0 K/UL    ABS. LYMPHOCYTES 2.3 0.9 - 3.6 K/UL    ABS. MONOCYTES 0.7 0.05 - 1.2 K/UL    ABS. EOSINOPHILS 0.1 0.0 - 0.4 K/UL    ABS. BASOPHILS 0.1 0.0 - 0.1 K/UL    ABS. IMM.  GRANS. 0.0 0.00 - 0.04 K/UL    DF AUTOMATED     METABOLIC PANEL, BASIC    Collection Time: 02/26/22 10:40 AM   Result Value Ref Range    Sodium 137 136 - 145 mmol/L    Potassium 4.4 3.5 - 5.5 mmol/L    Chloride 108 100 - 111 mmol/L    CO2 27 21 - 32 mmol/L    Anion gap 2 (L) 3.0 - 18 mmol/L    Glucose 93 74 - 99 mg/dL    BUN 7 7.0 - 18 MG/DL    Creatinine 0.65 0.6 - 1.3 MG/DL    BUN/Creatinine ratio 11 (L) 12 - 20      GFR est AA >60 >60 ml/min/1.73m2    GFR est non-AA >60 >60 ml/min/1.73m2    Calcium 8.7 8.5 - 10.1 MG/DL   TROPONIN-HIGH SENSITIVITY    Collection Time: 02/26/22 10:40 AM   Result Value Ref Range    Troponin-High Sensitivity <3 0 - 54 ng/L   HCG QL SERUM    Collection Time: 02/26/22 10:40 AM   Result Value Ref Range    HCG, Ql. Negative NEG         Radiologic Studies -   XR CHEST PA LAT   Final Result   1. Unremarkable chest                      Medical Decision Making   I am the first provider for this patient. I reviewed the vital signs, available nursing notes, past medical history, past surgical history, family history and social history. Vital Signs-Reviewed the patient's vital signs. Pulse Oximetry Analysis -  100% on room air    EKG: Interpreted by the EP. Time Interpreted: 1040   Rate: 76   Rhythm: NSR   Interpretation: no evidence of ischemia or arrhythmia     Records Reviewed: Nursing Notes, Old Medical Records and Previous electrocardiograms (Time of Review: 10:45 AM)    ED Course: Progress Notes, Reevaluation, and Consults:  10:48 AM Reviewed results and plan with patient. Notes she has not been taking her iron tablets as prescribed, will resume. Discussed need for close outpatient follow-up this week for reassessment. Discussed strict return precautions, including shortness of breath, vomiting, or any other medical concerns. In agreement with plan, ready to go home. Provider Notes (Medical Decision Making): 69-year-old female who presents to the ED due to central chest pain x 3 days. Afebrile, nontoxic-appearing, looks well. No evidence of tachycardia, tachypnea, hypoxia. EKG normal sinus rhythm, troponin negative. HEART score 1, low risk of MACE. Chest x-ray without acute process. Do not suspect ACS or PE. Stable for d/c with close outpatient follow-up for further assessment. Strict return precautions provided. Diagnosis     Clinical Impression:   1.  Chest pain, unspecified type        Disposition: home     Follow-up Information     Follow up With Specialties Details Why [address] [facility] EMERGENCY DEPT Emergency Medicine  If symptoms worsen [address] [place] [address] [address]  [address]  Schedule an appointment as soon as possible for a visit   [address] [facility] [address] [facility]           Discharge Medication List as of 2/26/2022 12:15 PM      CONTINUE these medications which have NOT CHANGED    Details   methylPREDNISolone (Medrol, Chuckie,) 4 mg tablet Please follow the instructions on the medrol dose pack., Normal, Disp-1 Dose Pack, R-0      !! albuterol (PROVENTIL HFA, VENTOLIN HFA, PROAIR HFA) 90 mcg/actuation inhaler Take 2 Puffs by inhalation every four (4) hours as needed for Wheezing or Shortness of Breath., Normal, Disp-1 Each, R-0      !! albuterol (PROVENTIL HFA, VENTOLIN HFA, PROAIR HFA) 90 mcg/actuation inhaler Take 2 Puffs by inhalation every four (4) hours as needed for Wheezing., Normal, Disp-1 Inhaler, R-0       !! - Potential duplicate medications found. Please discuss with provider. STOP taking these medications       guaiFENesin ER (Mucinex) 600 mg ER tablet Comments:   Reason for Stopping:               Dictation disclaimer:  Please note that this dictation was completed with [employer], the [employer] voice recognition software. Quite often unanticipated grammatical, syntax, homophones, and other interpretive errors are inadvertently transcribed by the computer software. Please disregard these errors. Please excuse any errors that have escaped final proofreading.

## 2022-02-26 NOTE — Clinical Note
[address] [place] [address] [address] [facility] EMERGENCY DEPT  [address] [address] [phone]    Work/School Note    Date: 2/26/2022    To Whom It May concern:      Kristine Alexander was seen and treated today in the emergency room by the following provider(s):  Attending Provider: Brianda Mann MD  Physician Assistant: Eddie Rodríguez, [address]. Kristine Alexander is excused from work/school on 02/26/22. She is clear to return to work/school on 02/27/22.         Sincerely,          ANGELIKA Cortes

## 2022-02-26 NOTE — DISCHARGE INSTRUCTIONS
Follow-up with your primary care physician within 2 days for reassessment. Bring the results from this visit with you for their review. Return to the ED immediately for any new, worsening, or persistent symptoms.

## 2022-02-26 NOTE — ED TRIAGE NOTES
Pt ambulatory to triage without assistance. Pt alert and oriented x 4. Pt reports constant non radiating chest pain since Thursday which worsens when eating. Pt rates pain a 10 on a standard pain scale. Denies N/V/D.   Denies cardiac history

## 2022-02-27 LAB
ATRIAL RATE: 76 BPM
CALCULATED P AXIS, ECG09: 51 DEGREES
CALCULATED R AXIS, ECG10: 75 DEGREES
CALCULATED T AXIS, ECG11: 64 DEGREES
DIAGNOSIS, 93000: NORMAL
P-R INTERVAL, ECG05: 136 MS
Q-T INTERVAL, ECG07: 356 MS
QRS DURATION, ECG06: 92 MS
QTC CALCULATION (BEZET), ECG08: 400 MS
VENTRICULAR RATE, ECG03: 76 BPM

## 2022-09-17 ENCOUNTER — HOSPITAL ENCOUNTER (EMERGENCY)
Age: 32
Discharge: HOME | End: 2022-09-17
Attending: EMERGENCY MEDICINE
Payer: COMMERCIAL

## 2022-09-17 ENCOUNTER — APPOINTMENT (OUTPATIENT)
Dept: GENERAL RADIOLOGY | Age: 32
End: 2022-09-17
Attending: EMERGENCY MEDICINE
Payer: COMMERCIAL

## 2022-09-17 VITALS
RESPIRATION RATE: 18 BRPM | HEART RATE: 79 BPM | OXYGEN SATURATION: 100 % | SYSTOLIC BLOOD PRESSURE: 130 MMHG | DIASTOLIC BLOOD PRESSURE: 74 MMHG | TEMPERATURE: 98.3 F

## 2022-09-17 DIAGNOSIS — S60.222A CONTUSION OF LEFT HAND, INITIAL ENCOUNTER: Primary | ICD-10-CM

## 2022-09-17 PROCEDURE — 99283 EMERGENCY DEPT VISIT LOW MDM: CPT

## 2022-09-17 PROCEDURE — 73110 X-RAY EXAM OF WRIST: CPT

## 2022-09-17 PROCEDURE — 73130 X-RAY EXAM OF HAND: CPT

## 2022-09-17 RX ORDER — NAPROXEN 500 MG/1
500 TABLET ORAL 2 TIMES DAILY WITH MEALS
Qty: 20 TABLET | Refills: 0 | Status: SHIPPED | OUTPATIENT
Start: 2022-09-17

## 2022-09-17 NOTE — ED PROVIDER NOTES
EMERGENCY DEPARTMENT HISTORY AND PHYSICAL EXAM        Date: 9/17/2022  Patient Name: Christian Floyd    History of Presenting Illness     Chief Complaint   Patient presents with    Hand Injury       History Provided By: Patient    HPI: Christian Floyd, 34 y.o. female PMHx significant for anemia, astigmatism presents ambulatory to the ED. Pt reports she was moving a TV last night and injured her left hand. Patient right-handed. Denies numbness, tingling, radiating pain, weakness. Patient has not taken anything for symptoms. Patient reports increased pain with movement. There are no other complaints, changes, or physical findings at this time. PCP: Other, MD Osmin    No current facility-administered medications on file prior to encounter. Current Outpatient Medications on File Prior to Encounter   Medication Sig Dispense Refill    methylPREDNISolone (Medrol, Chuckie,) 4 mg tablet Please follow the instructions on the medrol dose pack. 1 Dose Pack 0    albuterol (PROVENTIL HFA, VENTOLIN HFA, PROAIR HFA) 90 mcg/actuation inhaler Take 2 Puffs by inhalation every four (4) hours as needed for Wheezing or Shortness of Breath. 1 Each 0    albuterol (PROVENTIL HFA, VENTOLIN HFA, PROAIR HFA) 90 mcg/actuation inhaler Take 2 Puffs by inhalation every four (4) hours as needed for Wheezing.  1 Inhaler 0       Past History     Past Medical History:  Past Medical History:   Diagnosis Date    Anemia     Astigmatism        Past Surgical History:  Past Surgical History:   Procedure Laterality Date    HX GYN      csection       Family History:  Family History   Problem Relation Age of Onset    Heart Disease Maternal Grandmother     Heart Disease Maternal Grandfather     Heart Disease Paternal Grandmother     Heart Disease Paternal Grandfather        Social History:  Social History     Tobacco Use    Smoking status: Every Day     Packs/day: 0.25     Types: Cigarettes    Smokeless tobacco: Never    Tobacco comments:     2 black and milds per day   Substance Use Topics    Alcohol use: No    Drug use: No       Allergies:  No Known Allergies      Review of Systems   Review of Systems   Constitutional:  Negative for chills and fever. Respiratory:  Negative for shortness of breath. Cardiovascular:  Negative for chest pain. Gastrointestinal:  Negative for abdominal pain, nausea and vomiting. Genitourinary:  Negative for flank pain. Musculoskeletal:  Positive for arthralgias (left hand pain and swelling). Negative for back pain and myalgias. Skin:  Negative for color change, pallor, rash and wound. Neurological:  Negative for dizziness, weakness and light-headedness. All other systems reviewed and are negative. Physical Exam   Physical Exam  Vitals and nursing note reviewed. Constitutional:       General: She is not in acute distress. Appearance: She is well-developed. Comments: Pt in NAD   HENT:      Head: Normocephalic and atraumatic. Eyes:      Conjunctiva/sclera: Conjunctivae normal.   Cardiovascular:      Rate and Rhythm: Normal rate and regular rhythm. Heart sounds: Normal heart sounds. Pulmonary:      Effort: Pulmonary effort is normal. No respiratory distress. Breath sounds: Normal breath sounds. Abdominal:      General: Bowel sounds are normal. There is no distension. Palpations: Abdomen is soft. Musculoskeletal:         General: Normal range of motion. Comments: Left hand: Swelling and ecchymosis to dorsal aspect of hand. Rounding sensation intact. Full AROM intact. <2 sec cap refill. Skin:     General: Skin is warm. Findings: No rash. Neurological:      Mental Status: She is alert and oriented to person, place, and time. Psychiatric:         Behavior: Behavior normal.       Diagnostic Study Results     Labs -   No results found for this or any previous visit (from the past 12 hour(s)).     Radiologic Studies -   XR HAND LT MIN 3 V   Final Result   1. No acute pathology appreciated in left hand. XR WRIST LT AP/LAT/OBL MIN 3V    (Results Pending)     CT Results  (Last 48 hours)      None          CXR Results  (Last 48 hours)      None            Medical Decision Making   I am the first provider for this patient. I reviewed the vital signs, available nursing notes, past medical history, past surgical history, family history and social history. Vital Signs-Reviewed the patient's vital signs. Patient Vitals for the past 12 hrs:   Temp Pulse Resp BP SpO2   09/17/22 1529 98.3 °F (36.8 °C) 79 18 130/74 100 %       Records Reviewed: Nursing Notes, Old Medical Records, Previous Radiology Studies, and Previous Laboratory Studies    Provider Notes (Medical Decision Making):   DDx: Hand sprain vs fracture vs strain    33 yo F who presents with hand pain after trauma yesterday. On exam mild swelling ecchymosis. Full AROM intact. NVI. X-ray negative for fracture. Ace wrap applied and will treat patient symptomatically. At time of discharge, pt non-toxic appearing in NAD. Pt stable for prompt outpatient follow-up with PCP 1 to 2 days. Patient given strict instructions to return if symptoms worsen. ED Course:   Initial assessment performed. The patients presenting problems have been discussed, and they are in agreement with the care plan formulated and outlined with them. I have encouraged them to ask questions as they arise throughout their visit. Pt declined pregnancy test, stating that she is not concerned for pregnancy. Discussed risks with patient including birth defects and loss of pregnancy. Pt states she understands and accepts risks. Disposition:  5:48 PM  Discussed imaging results with pt along with dx and treatment plan. Discussed importance of PCP follow up. All questions answered. Pt voiced they understood. Return if sx worsen. PLAN:  1.    Current Discharge Medication List        START taking these medications Details   naproxen (NAPROSYN) 500 mg tablet Take 1 Tablet by mouth two (2) times daily (with meals). Qty: 20 Tablet, Refills: 0  Start date: 9/17/2022           2. Follow-up Information       Follow up With Specialties Details Why [address] [place] [address]  Schedule an appointment as soon as possible for a visit in 1 day  [address]  [address]  [address] [facility] [address]    [facility] EMERGENCY DEPT Emergency Medicine  As needed, If symptoms worsen [address] [facility] [address] [phone]          Return to ED if worse     Diagnosis     Clinical Impression:   1. Contusion of left hand, initial encounter        Attestations:    ANGELIKA Arechiga    Please note that this dictation was completed with [employer], the computer voice recognition software. Quite often unanticipated grammatical, syntax, homophones, and other interpretive errors are inadvertently transcribed by the computer software. Please disregard these errors. Please excuse any errors that have escaped final proofreading. Thank you.

## 2022-09-17 NOTE — ED TRIAGE NOTES
35 yo F to ED for L hand injury after trying to move TV last night. Ecchymosis noted to site.  Pt has pain moving the wrist and making a fist.

## 2022-12-31 ENCOUNTER — HOSPITAL ENCOUNTER (EMERGENCY)
Age: 32
Discharge: HOME | End: 2022-12-31
Attending: EMERGENCY MEDICINE
Payer: COMMERCIAL

## 2022-12-31 VITALS
BODY MASS INDEX: 22.49 KG/M2 | HEART RATE: 90 BPM | HEIGHT: 65 IN | TEMPERATURE: 97.7 F | SYSTOLIC BLOOD PRESSURE: 111 MMHG | WEIGHT: 135 LBS | OXYGEN SATURATION: 100 % | DIASTOLIC BLOOD PRESSURE: 60 MMHG | RESPIRATION RATE: 16 BRPM

## 2022-12-31 DIAGNOSIS — U07.1 COVID-19: Primary | ICD-10-CM

## 2022-12-31 LAB
ANION GAP SERPL CALC-SCNC: 2 MMOL/L (ref 3–18)
ATRIAL RATE: 89 BPM
BASOPHILS # BLD: 0 K/UL (ref 0–0.1)
BASOPHILS NFR BLD: 1 % (ref 0–2)
BUN SERPL-MCNC: 5 MG/DL (ref 7–18)
BUN/CREAT SERPL: 7 (ref 12–20)
CALCIUM SERPL-MCNC: 8.3 MG/DL (ref 8.5–10.1)
CALCULATED P AXIS, ECG09: 78 DEGREES
CALCULATED R AXIS, ECG10: 75 DEGREES
CALCULATED T AXIS, ECG11: 63 DEGREES
CHLORIDE SERPL-SCNC: 106 MMOL/L (ref 100–111)
CO2 SERPL-SCNC: 29 MMOL/L (ref 21–32)
CREAT SERPL-MCNC: 0.69 MG/DL (ref 0.6–1.3)
DIAGNOSIS, 93000: NORMAL
DIFFERENTIAL METHOD BLD: ABNORMAL
EOSINOPHIL # BLD: 0.2 K/UL (ref 0–0.4)
EOSINOPHIL NFR BLD: 2 % (ref 0–5)
ERYTHROCYTE [DISTWIDTH] IN BLOOD BY AUTOMATED COUNT: 19.1 % (ref 11.6–14.5)
FLUAV RNA SPEC QL NAA+PROBE: NOT DETECTED
FLUBV RNA SPEC QL NAA+PROBE: NOT DETECTED
GLUCOSE SERPL-MCNC: 110 MG/DL (ref 74–99)
HCT VFR BLD AUTO: 31.1 % (ref 35–45)
HGB BLD-MCNC: 9.5 G/DL (ref 12–16)
IMM GRANULOCYTES # BLD AUTO: 0 K/UL (ref 0–0.04)
IMM GRANULOCYTES NFR BLD AUTO: 0 % (ref 0–0.5)
LYMPHOCYTES # BLD: 1.9 K/UL (ref 0.9–3.6)
LYMPHOCYTES NFR BLD: 29 % (ref 21–52)
MCH RBC QN AUTO: 21.7 PG (ref 24–34)
MCHC RBC AUTO-ENTMCNC: 30.5 G/DL (ref 31–37)
MCV RBC AUTO: 71 FL (ref 78–100)
MONOCYTES # BLD: 0.7 K/UL (ref 0.05–1.2)
MONOCYTES NFR BLD: 11 % (ref 3–10)
NEUTS SEG # BLD: 3.8 K/UL (ref 1.8–8)
NEUTS SEG NFR BLD: 57 % (ref 40–73)
NRBC # BLD: 0 K/UL (ref 0–0.01)
NRBC BLD-RTO: 0 PER 100 WBC
P-R INTERVAL, ECG05: 156 MS
PLATELET # BLD AUTO: 409 K/UL (ref 135–420)
PMV BLD AUTO: 8.9 FL (ref 9.2–11.8)
POTASSIUM SERPL-SCNC: 3.2 MMOL/L (ref 3.5–5.5)
Q-T INTERVAL, ECG07: 358 MS
QRS DURATION, ECG06: 92 MS
QTC CALCULATION (BEZET), ECG08: 435 MS
RBC # BLD AUTO: 4.38 M/UL (ref 4.2–5.3)
SARS-COV-2, COV2: DETECTED
SODIUM SERPL-SCNC: 137 MMOL/L (ref 136–145)
TROPONIN-HIGH SENSITIVITY: 4 NG/L (ref 0–54)
VENTRICULAR RATE, ECG03: 89 BPM
WBC # BLD AUTO: 6.7 K/UL (ref 4.6–13.2)

## 2022-12-31 PROCEDURE — 84484 ASSAY OF TROPONIN QUANT: CPT

## 2022-12-31 PROCEDURE — 94640 AIRWAY INHALATION TREATMENT: CPT

## 2022-12-31 PROCEDURE — 74011000250 HC RX REV CODE- 250: Performed by: PHYSICIAN ASSISTANT

## 2022-12-31 PROCEDURE — 93005 ELECTROCARDIOGRAM TRACING: CPT

## 2022-12-31 PROCEDURE — 80048 BASIC METABOLIC PNL TOTAL CA: CPT

## 2022-12-31 PROCEDURE — 87636 SARSCOV2 & INF A&B AMP PRB: CPT

## 2022-12-31 PROCEDURE — 85025 COMPLETE CBC W/AUTO DIFF WBC: CPT

## 2022-12-31 PROCEDURE — 99284 EMERGENCY DEPT VISIT MOD MDM: CPT

## 2022-12-31 RX ORDER — ALBUTEROL SULFATE 90 UG/1
2 AEROSOL, METERED RESPIRATORY (INHALATION)
Qty: 18 G | Refills: 0 | Status: SHIPPED | OUTPATIENT
Start: 2022-12-31

## 2022-12-31 RX ORDER — IPRATROPIUM BROMIDE AND ALBUTEROL SULFATE 2.5; .5 MG/3ML; MG/3ML
3 SOLUTION RESPIRATORY (INHALATION)
Status: COMPLETED | OUTPATIENT
Start: 2022-12-31 | End: 2022-12-31

## 2022-12-31 RX ADMIN — IPRATROPIUM BROMIDE AND ALBUTEROL SULFATE 3 ML: .5; 3 SOLUTION RESPIRATORY (INHALATION) at 13:31

## 2022-12-31 NOTE — ED TRIAGE NOTES
Patient presents with chest pain, sob, sore throat that has all been going on x 2days. Pt states that she has taken around 5 covid test and they have all came back positive.  Pt denies n/v/d.

## 2022-12-31 NOTE — Clinical Note
[facility]  [facility] EMERGENCY DEPT  [address] [address] [phone]    Work/School Note    Date: 12/31/2022     To Whom It May concern:    Job Gamboa was evaluated by the following provider(s):  Attending Provider: Miranda Orozco MD  Physician Assistant: Mandi Orellana virus is suspected. Per the CDC guidelines we recommend home isolation until the following conditions are all met:    1. At least five days have passed since symptoms first appeared and/or had a close exposure,   2. After home isolation for five days, wearing a mask around others for the next five days,  3. At least 24 have passed since last fever without the use of fever-reducing medications and  4.  Symptoms (eg cough, shortness of breath) have improved      Sincerely,          Filp Montano PA-C

## 2022-12-31 NOTE — ED PROVIDER NOTES
EMERGENCY DEPARTMENT HISTORY AND PHYSICAL EXAM        Date: 12/31/2022  Patient Name: Disha Boyd    History of Presenting Illness     Chief Complaint   Patient presents with    Chest Pain    Shortness of Breath    Cough       History Provided By: Patient    HPI: Disha Boyd, 34 y.o. female PMHx significant for asthma presents to the ED with cc of cough and SOB. Patient reports 2-day history of productive cough with associated wheezing, chest tightness rhinorrhea and nasal congestion. She denies fevers, chills, myalgias. She states cough causes her to feel somewhat short of breath. She denies pleuritic pain. She has taken 5 COVID test today, all positive. She has taken Benadryl without resolution, states she has been using MDI every 2 hours without relief. PCP: Other, MD Osmin    No current facility-administered medications on file prior to encounter. Current Outpatient Medications on File Prior to Encounter   Medication Sig Dispense Refill    naproxen (NAPROSYN) 500 mg tablet Take 1 Tablet by mouth two (2) times daily (with meals). 20 Tablet 0    methylPREDNISolone (Medrol, Chuckie,) 4 mg tablet Please follow the instructions on the medrol dose pack. 1 Dose Pack 0    albuterol (PROVENTIL HFA, VENTOLIN HFA, PROAIR HFA) 90 mcg/actuation inhaler Take 2 Puffs by inhalation every four (4) hours as needed for Wheezing or Shortness of Breath. 1 Each 0    albuterol (PROVENTIL HFA, VENTOLIN HFA, PROAIR HFA) 90 mcg/actuation inhaler Take 2 Puffs by inhalation every four (4) hours as needed for Wheezing.  1 Inhaler 0       Past History     Past Medical History:  Past Medical History:   Diagnosis Date    Anemia     Astigmatism        Past Surgical History:  Past Surgical History:   Procedure Laterality Date    HX GYN      csection       Family History:  Family History   Problem Relation Age of Onset    Heart Disease Maternal Grandmother     Heart Disease Maternal Grandfather     Heart Disease Paternal Grandmother     Heart Disease Paternal Grandfather        Social History:  Social History     Tobacco Use    Smoking status: Every Day     Packs/day: 0.25     Types: Cigarettes    Smokeless tobacco: Never    Tobacco comments:     2 black and milds per day   Substance Use Topics    Alcohol use: No    Drug use: No       Allergies:  No Known Allergies      Review of Systems   Review of Systems  Constitutional: no fevers or chills  HEENT: no ear pain, eye drainage, vision changes, +rhinorrhea, congestion Respiratory: + Cough and dyspnea  Cardio: + Chest tightness  -palpitations  GI: no abdominal pain, nausea, vomiting, constipation or diarrhea  : no dysuria, frequency or hematuria  MSK: no extremity pain or swelling  Skin: no new rash or wounds  Neuro: no headache, dizziness, weakness, parasthesias  Psych: no change to mental status or behavior      Physical Exam   Physical Exam  CONSTITUTIONAL:  Alert, in no apparent distress;  well developed;  well nourished. HEENT:  Head NCAT, EOMI, non-icteric sclera, normal conjunctiva, no rhinorrhea, mucous membranes moist, no tonsillar erythema or exudate, bilateral TMs intact with normal light reflex, no erythema  NECK:  No lymphadenopathy, neck supple  RESPIRATORY:  Chest clear, equal breath sounds, good air movement. No wheezing or increased work of breathing  CARDIOVASCULAR:  Regular rate and rhythm. No murmurs, rubs, or gallops. MSK: ROM intact to all extremities, no joint swelling  NEURO: No gait ataxia or facial asymmetry  SKIN:  No rashes;  Normal for age. PSYCH:  Alert and normal affect.       Diagnostic Study Results     Labs -     Recent Results (from the past 12 hour(s))   EKG, 12 LEAD, INITIAL    Collection Time: 12/31/22 12:43 PM   Result Value Ref Range    Ventricular Rate 89 BPM    Atrial Rate 89 BPM    P-R Interval 156 ms    QRS Duration 92 ms    Q-T Interval 358 ms    QTC Calculation (Bezet) 435 ms    Calculated P Axis 78 degrees    Calculated R Axis 75 degrees    Calculated T Axis 63 degrees    Diagnosis       Normal sinus rhythm  Normal ECG  When compared with ECG of 26-FEB-2022 10:36,  T wave amplitude has decreased in Anterior leads  Confirmed by Reena Cadet MD, Estee Castellano ([id number]) on 12/31/2022 12:56:23 PM     COVID-19 WITH INFLUENZA A/B    Collection Time: 12/31/22 12:51 PM   Result Value Ref Range    SARS-CoV-2 by PCR Detected (AA) NOTD      Influenza A by PCR Not detected NOTD      Influenza B by PCR Not detected NOTD     CBC WITH AUTOMATED DIFF    Collection Time: 12/31/22 12:51 PM   Result Value Ref Range    WBC 6.7 4.6 - 13.2 K/uL    RBC 4.38 4.20 - 5.30 M/uL    HGB 9.5 (L) 12.0 - 16.0 g/dL    HCT 31.1 (L) 35.0 - 45.0 %    MCV 71.0 (L) 78.0 - 100.0 FL    MCH 21.7 (L) 24.0 - 34.0 PG    MCHC 30.5 (L) 31.0 - 37.0 g/dL    RDW 19.1 (H) 11.6 - 14.5 %    PLATELET 287 367 - 082 K/uL    MPV 8.9 (L) 9.2 - 11.8 FL    NRBC 0.0 0  WBC    ABSOLUTE NRBC 0.00 0.00 - 0.01 K/uL    NEUTROPHILS 57 40 - 73 %    LYMPHOCYTES 29 21 - 52 %    MONOCYTES 11 (H) 3 - 10 %    EOSINOPHILS 2 0 - 5 %    BASOPHILS 1 0 - 2 %    IMMATURE GRANULOCYTES 0 0.0 - 0.5 %    ABS. NEUTROPHILS 3.8 1.8 - 8.0 K/UL    ABS. LYMPHOCYTES 1.9 0.9 - 3.6 K/UL    ABS. MONOCYTES 0.7 0.05 - 1.2 K/UL    ABS. EOSINOPHILS 0.2 0.0 - 0.4 K/UL    ABS. BASOPHILS 0.0 0.0 - 0.1 K/UL    ABS. IMM.  GRANS. 0.0 0.00 - 0.04 K/UL    DF AUTOMATED     METABOLIC PANEL, BASIC    Collection Time: 12/31/22 12:51 PM   Result Value Ref Range    Sodium 137 136 - 145 mmol/L    Potassium 3.2 (L) 3.5 - 5.5 mmol/L    Chloride 106 100 - 111 mmol/L    CO2 29 21 - 32 mmol/L    Anion gap 2 (L) 3.0 - 18 mmol/L    Glucose 110 (H) 74 - 99 mg/dL    BUN 5 (L) 7.0 - 18 MG/DL    Creatinine 0.69 0.6 - 1.3 MG/DL    BUN/Creatinine ratio 7 (L) 12 - 20      eGFR >60 >60 ml/min/1.73m2    Calcium 8.3 (L) 8.5 - 10.1 MG/DL   TROPONIN-HIGH SENSITIVITY    Collection Time: 12/31/22 12:51 PM   Result Value Ref Range    Troponin-High Sensitivity 4 0 - 54 ng/L Radiologic Studies -   No orders to display     CT Results  (Last 48 hours)      None          CXR Results  (Last 48 hours)      None            Medical Decision Making   I am the first provider for this patient. I reviewed the vital signs, available nursing notes, past medical history, past surgical history, family history and social history. Vital Signs-Reviewed the patient's vital signs. Patient Vitals for the past 12 hrs:   Temp Pulse Resp BP SpO2   12/31/22 1238 97.7 °F (36.5 °C) 90 16 111/60 100 %       Provider Notes (Medical Decision Making):   Patient presenting with 2-day history of cough, congestion, reported wheezing, dyspnea and chest tightness. Differential diagnosis including COVID, influenza, viral bronchitis, asthma exacerbation, ACS, PE.    ED Course:   Initial assessment performed. The patients presenting problems have been discussed, and they are in agreement with the care plan formulated and outlined with them. I have encouraged them to ask questions as they arise throughout their visit. Patient remained stable throughout her stay, was given neb with resolution of perceived chest tightness. No further SOB, no cough heard. Results of diagnostics reviewed with patient including labs and EKG. Anemia seems baseline. COVID +. Doubt ACS, HEART score 0. PE unlikely, PERC negative. Will refill MDI and provide work note with COVID precautions. Sh eis otherwise well appearing, afebrile, not tachycardic, tachypneic, or hypoxic. Disposition:  Discharged    PLAN:  1. Current Discharge Medication List        START taking these medications    Details   !! albuterol (PROVENTIL HFA, VENTOLIN HFA, PROAIR HFA) 90 mcg/actuation inhaler Take 2 Puffs by inhalation every four (4) hours as needed for Wheezing. Qty: 18 g, Refills: 0  Start date: 12/31/2022       !! - Potential duplicate medications found. Please discuss with provider.         CONTINUE these medications which have NOT CHANGED    Details   !! albuterol (PROVENTIL HFA, VENTOLIN HFA, PROAIR HFA) 90 mcg/actuation inhaler Take 2 Puffs by inhalation every four (4) hours as needed for Wheezing or Shortness of Breath. Qty: 1 Each, Refills: 0      !! albuterol (PROVENTIL HFA, VENTOLIN HFA, PROAIR HFA) 90 mcg/actuation inhaler Take 2 Puffs by inhalation every four (4) hours as needed for Wheezing. Qty: 1 Inhaler, Refills: 0       !! - Potential duplicate medications found. Please discuss with provider. 2.   Follow-up Information       Follow up With Specialties Details Why [address]    [address]  [address]  [phone]          Return to ED if worse     Diagnosis     Clinical Impression:   1. COVID-19        Attestations:    Venancio Morley PA-C    Please note that this dictation was completed with [employer], the computer voice recognition software. Quite often unanticipated grammatical, syntax, homophones, and other interpretive errors are inadvertently transcribed by the computer software. Please disregard these errors. Please excuse any errors that have escaped final proofreading.   Thank you.  '

## 2023-04-29 ENCOUNTER — HOSPITAL ENCOUNTER (EMERGENCY)
Facility: HOSPITAL | Age: 33
Discharge: HOME | End: 2023-04-29
Attending: EMERGENCY MEDICINE
Payer: COMMERCIAL

## 2023-04-29 VITALS
TEMPERATURE: 98.2 F | DIASTOLIC BLOOD PRESSURE: 69 MMHG | RESPIRATION RATE: 16 BRPM | HEART RATE: 79 BPM | HEIGHT: 65 IN | OXYGEN SATURATION: 100 % | SYSTOLIC BLOOD PRESSURE: 124 MMHG | BODY MASS INDEX: 19.99 KG/M2 | WEIGHT: 120 LBS

## 2023-04-29 DIAGNOSIS — J45.42 MODERATE PERSISTENT ASTHMA WITH STATUS ASTHMATICUS: Primary | ICD-10-CM

## 2023-04-29 PROCEDURE — 6370000000 HC RX 637 (ALT 250 FOR IP): Performed by: HEALTH CARE PROVIDER

## 2023-04-29 PROCEDURE — 99283 EMERGENCY DEPT VISIT LOW MDM: CPT

## 2023-04-29 RX ORDER — PREDNISONE 20 MG/1
40 TABLET ORAL
Status: COMPLETED | OUTPATIENT
Start: 2023-04-29 | End: 2023-04-29

## 2023-04-29 RX ORDER — IPRATROPIUM BROMIDE AND ALBUTEROL SULFATE 2.5; .5 MG/3ML; MG/3ML
1 SOLUTION RESPIRATORY (INHALATION)
Status: COMPLETED | OUTPATIENT
Start: 2023-04-29 | End: 2023-04-29

## 2023-04-29 RX ORDER — PREDNISONE 20 MG/1
20 TABLET ORAL 2 TIMES DAILY
Qty: 8 TABLET | Refills: 0 | Status: SHIPPED | OUTPATIENT
Start: 2023-04-29 | End: 2023-05-03

## 2023-04-29 RX ADMIN — PREDNISONE 40 MG: 20 TABLET ORAL at 12:35

## 2023-04-29 RX ADMIN — IPRATROPIUM BROMIDE AND ALBUTEROL SULFATE 1 AMPULE: .5; 2.5 SOLUTION RESPIRATORY (INHALATION) at 12:35

## 2023-04-29 ASSESSMENT — PAIN - FUNCTIONAL ASSESSMENT: PAIN_FUNCTIONAL_ASSESSMENT: NONE - DENIES PAIN

## 2023-04-29 ASSESSMENT — ENCOUNTER SYMPTOMS
ABDOMINAL PAIN: 0
VOMITING: 0
NAUSEA: 0
SHORTNESS OF BREATH: 1
COUGH: 1
WHEEZING: 1
DIARRHEA: 0
CHEST TIGHTNESS: 0

## 2023-04-29 NOTE — ED PROVIDER NOTES
EMERGENCY DEPARTMENT HISTORY AND PHYSICAL EXAM        Date: 4/29/2023  Patient Name: Lola Stratton    History of Presenting Illness     Chief Complaint   Patient presents with    Asthma       History Provided By: History obtained from patient    HPI: Lola Stratton, 28 y.o. female PMHx significant for no chronic medical condition presents by personal vehicle to the ED with cc of asthma exacerbation. Patient states her wheezing and shortness of breath has been getting worse over the last 7 days with increased severity over the last 2 days. She reports using her albuterol inhaler every 2 hours, she last used it 1 hour prior to arrival.  She also endorses a productive cough. She denies nausea, vomiting, diarrhea, fever, chill, no severe chest pain, no lightheadedness or cyanosis. She states that she sees an allergy specialist and has an appointment on Monday    There are no other complaints, changes, or physical findings at this time. PCP: No primary care provider on file. No current facility-administered medications on file prior to encounter. Current Outpatient Medications on File Prior to Encounter   Medication Sig Dispense Refill    albuterol sulfate HFA (PROVENTIL;VENTOLIN;PROAIR) 108 (90 Base) MCG/ACT inhaler Inhale 2 puffs into the lungs every 4 hours as needed      methylPREDNISolone (MEDROL DOSEPACK) 4 MG tablet Please follow the instructions on the medrol dose pack.       naproxen (NAPROSYN) 500 MG tablet Take 500 mg by mouth 2 times daily (with meals)           Past History     Past Medical History:  Past Medical History:   Diagnosis Date    Anemia     Astigmatism        Past Surgical History:  Past Surgical History:   Procedure Laterality Date    GYN      csection       Family History:  Family History   Problem Relation Age of Onset    Heart Disease Paternal Grandmother     Heart Disease Maternal Grandfather     Heart Disease Maternal Grandmother     Heart Disease Paternal Grandfather

## 2023-04-29 NOTE — ED TRIAGE NOTES
Pt c/o increased SHOB/wheezing x3 days, hx of asthma, has tried rescue inhaler with out relief of symptoms.

## 2023-04-29 NOTE — ED NOTES
Pt was given discharge instructions and pt verbalized understanding.      Brigida Zelaya, RN  04/29/23 1791

## 2024-02-03 ENCOUNTER — HOSPITAL ENCOUNTER (EMERGENCY)
Facility: HOSPITAL | Age: 34
Discharge: HOME | End: 2024-02-03
Payer: COMMERCIAL

## 2024-02-03 VITALS
BODY MASS INDEX: 19.97 KG/M2 | SYSTOLIC BLOOD PRESSURE: 124 MMHG | OXYGEN SATURATION: 99 % | WEIGHT: 120 LBS | RESPIRATION RATE: 18 BRPM | TEMPERATURE: 98.2 F | HEART RATE: 88 BPM | DIASTOLIC BLOOD PRESSURE: 52 MMHG

## 2024-02-03 DIAGNOSIS — M43.6 TORTICOLLIS: Primary | ICD-10-CM

## 2024-02-03 PROCEDURE — 99283 EMERGENCY DEPT VISIT LOW MDM: CPT

## 2024-02-03 RX ORDER — METHOCARBAMOL 750 MG/1
750 TABLET, FILM COATED ORAL 4 TIMES DAILY
Qty: 40 TABLET | Refills: 0 | Status: SHIPPED | OUTPATIENT
Start: 2024-02-03 | End: 2024-02-13

## 2024-02-03 ASSESSMENT — ENCOUNTER SYMPTOMS
SHORTNESS OF BREATH: 0
CHEST TIGHTNESS: 0
BACK PAIN: 0

## 2024-02-03 NOTE — ED PROVIDER NOTES
EMERGENCY DEPARTMENT HISTORY AND PHYSICAL EXAM        Date: 2/3/2024  Patient Name: Eloy Jensen    History of Presenting Illness     Chief Complaint   Patient presents with    Neck Pain       History Provided By: History obtained from patient    HPI: Eloy Jensen, 34 y.o. female presents to the ED with cc of neck pain and stiffness x 1.5 weeks    1/2 weeks ago patient woke up with neck stiffness and pain on the left side.  She has tried ibuprofen Tylenol and heat packs over this time without resolution of symptoms.  She denies any trauma at the onset, no neurological deficits.    No nausea, vomiting, diarrhea, fever, chills, chest pain, shortness of breath, leg swelling     There are no other complaints, changes, or physical findings at this time.    Records Reviewed: none    PCP: No primary care provider on file.    No current facility-administered medications on file prior to encounter.     Current Outpatient Medications on File Prior to Encounter   Medication Sig Dispense Refill    albuterol sulfate HFA (PROVENTIL;VENTOLIN;PROAIR) 108 (90 Base) MCG/ACT inhaler Inhale 2 puffs into the lungs every 4 hours as needed      methylPREDNISolone (MEDROL DOSEPACK) 4 MG tablet Please follow the instructions on the medrol dose pack.      naproxen (NAPROSYN) 500 MG tablet Take 500 mg by mouth 2 times daily (with meals)             Past History     Past Medical History:  Past Medical History:   Diagnosis Date    Anemia     Astigmatism        Past Surgical History:  Past Surgical History:   Procedure Laterality Date    GYN      csection       Family History:  Family History   Problem Relation Age of Onset    Heart Disease Paternal Grandmother     Heart Disease Maternal Grandfather     Heart Disease Maternal Grandmother     Heart Disease Paternal Grandfather        Social History:  Social History     Tobacco Use    Smoking status: Every Day     Current packs/day: 0.25     Types: Cigarettes    Smokeless tobacco: Never

## 2024-02-03 NOTE — ED NOTES
Patient discharged to home. Discharge papers explained and given to patient. Escorted to ER exit.

## 2024-02-20 ENCOUNTER — HOSPITAL ENCOUNTER (EMERGENCY)
Facility: HOSPITAL | Age: 34
Discharge: HOME | End: 2024-02-20
Payer: COMMERCIAL

## 2024-02-20 VITALS
TEMPERATURE: 99.9 F | BODY MASS INDEX: 19.97 KG/M2 | HEIGHT: 65 IN | OXYGEN SATURATION: 100 % | RESPIRATION RATE: 18 BRPM | SYSTOLIC BLOOD PRESSURE: 108 MMHG | DIASTOLIC BLOOD PRESSURE: 77 MMHG | HEART RATE: 105 BPM

## 2024-02-20 DIAGNOSIS — J10.1 INFLUENZA B: Primary | ICD-10-CM

## 2024-02-20 DIAGNOSIS — H66.002 NON-RECURRENT ACUTE SUPPURATIVE OTITIS MEDIA OF LEFT EAR WITHOUT SPONTANEOUS RUPTURE OF TYMPANIC MEMBRANE: ICD-10-CM

## 2024-02-20 DIAGNOSIS — R09.81 NASAL CONGESTION: ICD-10-CM

## 2024-02-20 DIAGNOSIS — R19.7 DIARRHEA, UNSPECIFIED TYPE: ICD-10-CM

## 2024-02-20 LAB
FLUAV RNA SPEC QL NAA+PROBE: NOT DETECTED
FLUBV RNA SPEC QL NAA+PROBE: DETECTED
SARS-COV-2 RNA RESP QL NAA+PROBE: NOT DETECTED

## 2024-02-20 PROCEDURE — 99283 EMERGENCY DEPT VISIT LOW MDM: CPT

## 2024-02-20 PROCEDURE — 87636 SARSCOV2 & INF A&B AMP PRB: CPT

## 2024-02-20 RX ORDER — AMOXICILLIN AND CLAVULANATE POTASSIUM 875; 125 MG/1; MG/1
1 TABLET, FILM COATED ORAL 2 TIMES DAILY
Qty: 14 TABLET | Refills: 0 | Status: SHIPPED | OUTPATIENT
Start: 2024-02-20 | End: 2024-02-27

## 2024-02-20 RX ORDER — ONDANSETRON 4 MG/1
4 TABLET, ORALLY DISINTEGRATING ORAL 3 TIMES DAILY PRN
Qty: 21 TABLET | Refills: 0 | Status: SHIPPED | OUTPATIENT
Start: 2024-02-20

## 2024-02-20 ASSESSMENT — ENCOUNTER SYMPTOMS
COUGH: 1
VOMITING: 0
ABDOMINAL PAIN: 0
NAUSEA: 1
RHINORRHEA: 1
SORE THROAT: 1
DIARRHEA: 1

## 2024-02-20 NOTE — ED PROVIDER NOTES
EMERGENCY DEPARTMENT HISTORY AND PHYSICAL EXAM    12:13 PM      Date: 2/20/2024  Patient Name: Eloy Jensen    History of Presenting Illness     Chief Complaint   Patient presents with    Generalized Body Aches    Cough    Pharyngitis         History Provided By: Patient and medical chart review.    Additional History (Context): Eloy Jensen is a 34 y.o. female with   Past Medical History:   Diagnosis Date    Anemia     Astigmatism    who presents with complaints of body aches, cough, congestion, runny nose, sore throat, nausea, diarrhea, and ear pain states started on Saturday but got worse yesterday.  States she is taking Tylenol for symptoms with minimal relief.  Has not taken anything for symptoms today.  Denies any vomiting.  Denies any fevers.  Reports about 3 episodes of diarrhea today.    PCP: No primary care provider on file.    No current facility-administered medications for this encounter.     Current Outpatient Medications   Medication Sig Dispense Refill    ondansetron (ZOFRAN-ODT) 4 MG disintegrating tablet Take 1 tablet by mouth 3 times daily as needed for Nausea or Vomiting 21 tablet 0    amoxicillin-clavulanate (AUGMENTIN) 875-125 MG per tablet Take 1 tablet by mouth 2 times daily for 7 days 14 tablet 0    albuterol sulfate HFA (PROVENTIL;VENTOLIN;PROAIR) 108 (90 Base) MCG/ACT inhaler Inhale 2 puffs into the lungs every 4 hours as needed      methylPREDNISolone (MEDROL DOSEPACK) 4 MG tablet Please follow the instructions on the medrol dose pack.      naproxen (NAPROSYN) 500 MG tablet Take 500 mg by mouth 2 times daily (with meals)         Past History     Past Medical History:  Past Medical History:   Diagnosis Date    Anemia     Astigmatism        Past Surgical History:  Past Surgical History:   Procedure Laterality Date    GYN      csection       Family History:  Family History   Problem Relation Age of Onset    Heart Disease Paternal Grandmother     Heart Disease Maternal Grandfather

## 2024-02-20 NOTE — DISCHARGE INSTRUCTIONS
Push fluids, use a vaporizer, use Tylenol or OTC NSAID's (Advil, Alleve etc) for fever or achiness, OTC cough suppressant/decongestants such as Robitussin CF and rest.  May take Pepto-Bismol or Imodium as needed for diarrhea.  Take medication as prescribed. Follow-up with your primary care physician within 2 days for reassessment. Bring the results from this visit with you for their review. Return to the ED immediately for any new, worsening, or persistent symptoms, including fever, vomiting, chest pain, shortness of breath, or any other medical concerns.

## 2024-07-07 ENCOUNTER — HOSPITAL ENCOUNTER (EMERGENCY)
Facility: HOSPITAL | Age: 34
Discharge: HOME | End: 2024-07-07
Attending: EMERGENCY MEDICINE
Payer: COMMERCIAL

## 2024-07-07 ENCOUNTER — APPOINTMENT (OUTPATIENT)
Facility: HOSPITAL | Age: 34
End: 2024-07-07
Payer: COMMERCIAL

## 2024-07-07 VITALS
HEART RATE: 80 BPM | DIASTOLIC BLOOD PRESSURE: 65 MMHG | HEIGHT: 65 IN | BODY MASS INDEX: 23.32 KG/M2 | SYSTOLIC BLOOD PRESSURE: 105 MMHG | OXYGEN SATURATION: 96 % | WEIGHT: 140 LBS | TEMPERATURE: 98.9 F | RESPIRATION RATE: 16 BRPM

## 2024-07-07 DIAGNOSIS — S90.32XA CONTUSION OF LEFT FOOT, INITIAL ENCOUNTER: Primary | ICD-10-CM

## 2024-07-07 PROCEDURE — 99283 EMERGENCY DEPT VISIT LOW MDM: CPT

## 2024-07-07 PROCEDURE — 73630 X-RAY EXAM OF FOOT: CPT

## 2024-07-07 PROCEDURE — 6370000000 HC RX 637 (ALT 250 FOR IP): Performed by: EMERGENCY MEDICINE

## 2024-07-07 RX ORDER — IBUPROFEN 800 MG/1
800 TABLET ORAL 2 TIMES DAILY PRN
Qty: 30 TABLET | Refills: 1 | Status: SHIPPED | OUTPATIENT
Start: 2024-07-07

## 2024-07-07 RX ORDER — ACETAMINOPHEN 500 MG
1000 TABLET ORAL 4 TIMES DAILY PRN
Qty: 30 TABLET | Refills: 1 | Status: SHIPPED | OUTPATIENT
Start: 2024-07-07

## 2024-07-07 RX ORDER — IBUPROFEN 400 MG/1
800 TABLET ORAL
Status: COMPLETED | OUTPATIENT
Start: 2024-07-07 | End: 2024-07-07

## 2024-07-07 RX ADMIN — IBUPROFEN 800 MG: 400 TABLET ORAL at 10:13

## 2024-07-07 ASSESSMENT — PAIN SCALES - GENERAL: PAINLEVEL_OUTOF10: 6

## 2024-07-07 ASSESSMENT — PAIN DESCRIPTION - DESCRIPTORS: DESCRIPTORS: SHARP

## 2024-07-07 ASSESSMENT — PAIN - FUNCTIONAL ASSESSMENT: PAIN_FUNCTIONAL_ASSESSMENT: 0-10

## 2024-07-07 ASSESSMENT — PAIN DESCRIPTION - PAIN TYPE: TYPE: ACUTE PAIN

## 2024-07-07 ASSESSMENT — PAIN DESCRIPTION - ORIENTATION: ORIENTATION: LEFT

## 2024-07-07 ASSESSMENT — PAIN DESCRIPTION - LOCATION: LOCATION: FOOT

## 2024-07-07 NOTE — ED PROVIDER NOTES
[facility] EMERGENCY DEPT  EMERGENCY DEPARTMENT ENCOUNTER      Pt Name: Eloy Jensen  MRN: [id number]  Birthdate 1990  Date of evaluation: 7/7/2024  Provider: JERMAN Erickson  10:10 AM    CHIEF COMPLAINT       Chief Complaint   Patient presents with    Foot Pain         HISTORY OF PRESENT ILLNESS    Eloy Jensen is a 34 y.o. female who presents to the emergency department with left foot injury.  She dropped a grocery bag that was fall directly onto her left foot accidentally this morning.  Hurts to ambulate.  Denies possibility of pregnancy.  Denies any open wounds.    HPI    Nursing Notes were reviewed.    REVIEW OF SYSTEMS       Review of Systems   Musculoskeletal:  Positive for arthralgias and gait problem. Negative for joint swelling.   Neurological:  Negative for weakness and numbness.   Hematological:  Does not bruise/bleed easily.       Except as noted above the remainder of the review of systems was reviewed and negative.       PAST MEDICAL HISTORY     Past Medical History:   Diagnosis Date    Anemia     Astigmatism          SURGICAL HISTORY       Past Surgical History:   Procedure Laterality Date    GYN      csection         CURRENT MEDICATIONS       Previous Medications    ALBUTEROL SULFATE HFA (PROVENTIL;VENTOLIN;PROAIR) 108 (90 BASE) MCG/ACT INHALER    Inhale 2 puffs into the lungs every 4 hours as needed    METHYLPREDNISOLONE (MEDROL DOSEPACK) 4 MG TABLET    Please follow the instructions on the medrol dose pack.    NAPROXEN (NAPROSYN) 500 MG TABLET    Take 500 mg by mouth 2 times daily (with meals)    ONDANSETRON (ZOFRAN-ODT) 4 MG DISINTEGRATING TABLET    Take 1 tablet by mouth 3 times daily as needed for Nausea or Vomiting       ALLERGIES     Patient has no known allergies.    FAMILY HISTORY       Family History   Problem Relation Age of Onset    Heart Disease Paternal Grandmother     Heart Disease Maternal Grandfather     Heart Disease Maternal Grandmother     Heart Disease Paternal

## 2024-07-07 NOTE — ED TRIAGE NOTES
Received patient in triage with c/o left foot pain. Pt stated that she dropped a case of soda on her foot on Friday and pain has been getting worse.